# Patient Record
Sex: MALE | Race: WHITE | NOT HISPANIC OR LATINO | Employment: FULL TIME | URBAN - METROPOLITAN AREA
[De-identification: names, ages, dates, MRNs, and addresses within clinical notes are randomized per-mention and may not be internally consistent; named-entity substitution may affect disease eponyms.]

---

## 2017-11-08 ENCOUNTER — OFFICE VISIT (OUTPATIENT)
Dept: FAMILY MEDICINE | Facility: CLINIC | Age: 32
End: 2017-11-08
Payer: COMMERCIAL

## 2017-11-08 VITALS
HEIGHT: 69 IN | DIASTOLIC BLOOD PRESSURE: 80 MMHG | BODY MASS INDEX: 46.65 KG/M2 | HEART RATE: 70 BPM | SYSTOLIC BLOOD PRESSURE: 178 MMHG | WEIGHT: 315 LBS | TEMPERATURE: 97.2 F

## 2017-11-08 DIAGNOSIS — I10 ESSENTIAL HYPERTENSION WITH GOAL BLOOD PRESSURE LESS THAN 140/90: Primary | ICD-10-CM

## 2017-11-08 LAB
ANION GAP SERPL CALCULATED.3IONS-SCNC: 6 MMOL/L (ref 3–14)
BUN SERPL-MCNC: 14 MG/DL (ref 7–30)
CALCIUM SERPL-MCNC: 8.6 MG/DL (ref 8.5–10.1)
CHLORIDE SERPL-SCNC: 107 MMOL/L (ref 94–109)
CO2 SERPL-SCNC: 28 MMOL/L (ref 20–32)
CREAT SERPL-MCNC: 0.78 MG/DL (ref 0.66–1.25)
GFR SERPL CREATININE-BSD FRML MDRD: >90 ML/MIN/1.7M2
GLUCOSE SERPL-MCNC: 101 MG/DL (ref 70–99)
LDLC SERPL DIRECT ASSAY-MCNC: 119 MG/DL
POTASSIUM SERPL-SCNC: 4 MMOL/L (ref 3.4–5.3)
SODIUM SERPL-SCNC: 141 MMOL/L (ref 133–144)

## 2017-11-08 PROCEDURE — 99213 OFFICE O/P EST LOW 20 MIN: CPT | Performed by: NURSE PRACTITIONER

## 2017-11-08 PROCEDURE — 36415 COLL VENOUS BLD VENIPUNCTURE: CPT | Performed by: NURSE PRACTITIONER

## 2017-11-08 PROCEDURE — 83721 ASSAY OF BLOOD LIPOPROTEIN: CPT | Performed by: NURSE PRACTITIONER

## 2017-11-08 PROCEDURE — 80048 BASIC METABOLIC PNL TOTAL CA: CPT | Performed by: NURSE PRACTITIONER

## 2017-11-08 NOTE — NURSING NOTE
"Chief Complaint   Patient presents with     Hypertension     recheck       Initial There were no vitals taken for this visit. Estimated body mass index is 50.8 kg/(m^2) as calculated from the following:    Height as of 2/17/15: 5' 9\" (1.753 m).    Weight as of 12/7/15: 344 lb (156 kg).  Medication Reconciliation: complete  "

## 2017-11-08 NOTE — LETTER
[address] [id number] [phone]       November 10, 2017    José Miguel Dickinson  [address]  [facility] [address]          Dear José Miguel,    Your Fasting blood sugar is borderline at 101, we would like to see this less than 100. LDL cholesterol was good at 119.    Results for orders placed or performed in visit on 11/08/17   Basic metabolic panel   Result Value Ref Range    Sodium 141 133 - 144 mmol/L    Potassium 4.0 3.4 - 5.3 mmol/L    Chloride 107 94 - 109 mmol/L    Carbon Dioxide 28 20 - 32 mmol/L    Anion Gap 6 3 - 14 mmol/L    Glucose 101 (H) 70 - 99 mg/dL    Urea Nitrogen 14 7 - 30 mg/dL    Creatinine 0.78 0.66 - 1.25 mg/dL    GFR Estimate >90 >60 mL/min/1.7m2    GFR Estimate If Black >90 >60 mL/min/1.7m2    Calcium 8.6 8.5 - 10.1 mg/dL   LDL cholesterol direct   Result Value Ref Range    LDL Cholesterol Direct 119 (H) <100 mg/dL       It was a pleasure to see you at your last appointment.    If you have any questions or concerns, please call [phone].    Sincerely,      Debra Schwartz, NIKITA/tf

## 2017-11-08 NOTE — MR AVS SNAPSHOT
"              After Visit Summary   2017    José Miguel Dickinson    MRN: [id number]           Patient Information     Date Of Birth          1985        Visit Information        Provider Department      2017 2:30 PM Debra Schwartz APRN CNP [facility]        Today's Diagnoses     Hypertension goal BP (blood pressure) < 140/90    -  1       Follow-ups after your visit        Who to contact     If you have questions or need follow up information about today's clinic visit or your schedule please contact [facility] directly at [phone].  Normal or non-critical lab and imaging results will be communicated to you by [employer], letter or phone within 4 business days after the clinic has received the results. If you do not hear from us within 7 days, please contact the clinic through [employer] or phone. If you have a critical or abnormal lab result, we will notify you by phone as soon as possible.  Submit refill requests through [employer] or call your pharmacy and they will forward the refill request to us. Please allow 3 business days for your refill to be completed.          Additional Information About Your Visit        MyChart Information     [employer] lets you send messages to your doctor, view your test results, renew your prescriptions, schedule appointments and more. To sign up, go to [email or web address] . Click on \"Log in\" on the left side of the screen, which will take you to the Welcome page. Then click on \"Sign up Now\" on the right side of the page.     You will be asked to enter the access code listed below, as well as some personal information. Please follow the directions to create your username and password.     Your access code is: [email or web address]  Expires: 2018  3:54 PM     Your access code will  in 90 days. If you need help or a new code, please call your [facility] or [phone].        Care EveryWhere ID     This is your Care EveryWhere ID. " "This could be used by other organizations to access your [place] medical records  [id number]        Your Vitals Were     Pulse Temperature Height BMI (Body Mass Index)          70 97.2  F (36.2  C) (Tympanic) 5' 9\" (1.753 m) 50.18 kg/m2         Blood Pressure from Last 3 Encounters:   11/08/17 178/80   12/07/15 124/80   11/28/15 150/89    Weight from Last 3 Encounters:   11/08/17 (!) 339 lb 12.8 oz (154.1 kg)   12/07/15 (!) 344 lb (156 kg)   11/28/15 (!) 345 lb (156.5 kg)              We Performed the Following     Basic metabolic panel     LDL cholesterol direct        Primary Care Provider Office Phone # Fax #    Nghia Barrow [phone]       [facility] [address] [place] [address]        Equal Access to Services     MAYUR ASH : Hadii aad ku hadasho Soomaali, waaxda luqadaha, qaybta kaalmada adeegyada, waxay chonin haydarwinn nissa suero . So [facility] [place] [facility] [phone].    ATENCIÓN: Si habla español, tiene a wyman disposición servicios gratuitos de asistencia lingüística. Llame al [phone].    We comply with applicable federal civil rights laws and [place] laws. We do not discriminate on the basis of race, color, national origin, age, disability, sex, sexual orientation, or gender identity.            Thank you!     Thank you for choosing [facility]  for your care. Our goal is always to provide you with excellent care. Hearing back from our patients is one way we can continue to improve our services. Please take a few minutes to complete the written survey that you may receive in the mail after your visit with us. Thank you!             Your Updated Medication List - Protect others around you: Learn how to safely use, store and throw away your medicines at www.disposemymeds.org.          This list is accurate as of: 11/8/17  3:54 PM.  Always use your most recent med list.                   Brand Name Dispense Instructions for use Diagnosis    " lisinopril 10 MG tablet    PRINIVIL/ZESTRIL    30 tablet    TAKE ONE TABLET BY MOUTH ONCE DAILY    Hypertension goal BP (blood pressure) < 140/90       omeprazole 20 MG CR capsule    priLOSEC    60 capsule    TAKE 1 CAPSULE (20 MG) BY MOUTH 2 TIMES DAILY    Gastroesophageal reflux disease without esophagitis

## 2017-11-08 NOTE — PROGRESS NOTES
SUBJECTIVE:   José Miguel Dickinson is a 31 year old male who presents to clinic today for the following health issues:      Hypertension Follow-up      Outpatient blood pressures are being checked at home.  Results are 125-140/80-90/s.    Low Salt Diet: no added salt        Amount of exercise or physical activity: job related activities    Problems taking medications regularly: Yes,  Has been out of meds for about 2 1/2 weeks, hasnt been around to  at pharmacy. Has not started recent refill yet    Medication side effects: none    Diet: regular (no restrictions)        The patient is seen in clinic today to follow-up hypertension management. He hasn't been into the clinic for couple years and is due for lab work. [place], and is only home for short period of time, he states he barely is able to get things done at home, and does not want to come into the clinic anymore than absolute necessary. He ran out of his blood pressure medication 2 or 3 weeks ago. He denies chest pain or palpitations, denies shortness of breath. He's had no problems swelling of the feet or ankles. He is not watching his diet. He is physically active at his job. He denies adverse side effects to the medication. He checks his blood pressure periodically and states it runs in the 120s to the 140s usually. At times it will go up to the 150s, but then comes back down to normal.    Problem list and histories reviewed & adjusted, as indicated.  Additional history: as documented    BP Readings from Last 3 Encounters:   11/08/17 178/80   12/07/15 124/80   11/28/15 150/89    Wt Readings from Last 3 Encounters:   11/08/17 (!) 339 lb 12.8 oz (154.1 kg)   12/07/15 (!) 344 lb (156 kg)   11/28/15 (!) 345 lb (156.5 kg)                      Reviewed and updated as needed this visit by clinical staffTobacco  Allergies  Meds  Med Hx  Surg Hx  Fam Hx  Soc Hx      Reviewed and updated as needed this visit by Provider      "    ROS:  Constitutional, HEENT, cardiovascular, pulmonary, gi and gu systems are negative, except as otherwise noted.      OBJECTIVE:   /80  Pulse 70  Temp 97.2  F (36.2  C) (Tympanic)  Ht 5' 9\" (1.753 m)  Wt (!) 339 lb 12.8 oz (154.1 kg)  BMI 50.18 kg/m2  Body mass index is 50.18 kg/(m^2).   GENERAL: healthy, alert and no distress. He is overweight  NECK: no adenopathy, no asymmetry, masses, or scars and thyroid normal to palpation  RESP: lungs clear to auscultation - no rales, rhonchi or wheezes  CV: regular rate and rhythm, normal S1 S2, no S3 or S4, no murmur, click or rub, no peripheral edema and peripheral pulses strong  ABDOMEN: soft, nontender, no hepatosplenomegaly, no masses and bowel sounds normal  MS: no gross musculoskeletal defects noted, no edema    Diagnostic Test Results:  Results for orders placed or performed in visit on 11/08/17 (from the past 24 hour(s))   Basic metabolic panel   Result Value Ref Range    Sodium 141 133 - 144 mmol/L    Potassium 4.0 3.4 - 5.3 mmol/L    Chloride 107 94 - 109 mmol/L    Carbon Dioxide 28 20 - 32 mmol/L    Anion Gap 6 3 - 14 mmol/L    Glucose 101 (H) 70 - 99 mg/dL    Urea Nitrogen 14 7 - 30 mg/dL    Creatinine 0.78 0.66 - 1.25 mg/dL    GFR Estimate >90 >60 mL/min/1.7m2    GFR Estimate If Black >90 >60 mL/min/1.7m2    Calcium 8.6 8.5 - 10.1 mg/dL   LDL cholesterol direct   Result Value Ref Range    LDL Cholesterol Direct 119 (H) <100 mg/dL       ASSESSMENT/PLAN:     Problem List Items Addressed This Visit        Medium    Essential hypertension with goal blood pressure less than 140/90 - Primary    Relevant Orders    Basic metabolic panel (Completed)    LDL cholesterol direct (Completed)           Continue lisinopril 10 milligrams daily. Prescription has already been refilled, he has not picked it up yet. He will check his blood pressure at home, will contact clinic with the readings. He is aware if it does not come within normal parameters, we may need " to increase the dose or add another medication    SUDHA Joseph CNP  [facility]

## 2017-12-04 DIAGNOSIS — I10 HYPERTENSION GOAL BP (BLOOD PRESSURE) < 140/90: ICD-10-CM

## 2017-12-04 RX ORDER — LISINOPRIL 10 MG/1
TABLET ORAL
Qty: 30 TABLET | Refills: 0 | Status: SHIPPED | OUTPATIENT
Start: 2017-12-04 | End: 2018-01-05

## 2017-12-04 NOTE — TELEPHONE ENCOUNTER
Reason for Call:  Medication or medication refill:    Do you use a [facility]?  Name of the pharmacy and phone number for the current request:  [facility] - [phone]    Name of the medication requested: lisinopril    Other request: patient is needing this medication refilled and refuses to come in and get his blood pressure checked.  He says that he checks it at home and it has been normal.  It is at 130/85    Can we leave a detailed message on this number? YES    Phone number patient can be reached at: Home number on file [phone] (home)    Best Time: anytime    Call taken on 12/4/2017 at 9:11 AM by Linda Rosenthal

## 2018-01-05 DIAGNOSIS — I10 HYPERTENSION GOAL BP (BLOOD PRESSURE) < 140/90: ICD-10-CM

## 2018-01-05 NOTE — TELEPHONE ENCOUNTER
Requested Prescriptions   Pending Prescriptions Disp Refills     lisinopril (PRINIVIL/ZESTRIL) 10 MG tablet [Pharmacy Med Name: LISINOPRIL 10MG TABS] 30 tablet 0     Sig: TAKE ONE TABLET BY MOUTH ONCE DAILY    ACE Inhibitors (Including Combos) Protocol Failed    1/5/2018  2:48 PM       Failed - Blood pressure under 140/90    BP Readings from Last 3 Encounters:   11/08/17 178/80   12/07/15 124/80   11/28/15 150/89                Passed - Recent or future visit with authorizing provider's specialty    Patient had office visit in the last year or has a visit in the next 30 days with authorizing provider.  See chart review.              Passed - Patient is age 18 or older       Passed - Normal serum creatinine on file in past 12 months    Recent Labs   Lab Test  11/08/17   1428   CR  0.78            Passed - Normal serum potassium on file in past 12 months    Recent Labs   Lab Test  11/08/17   1428   POTASSIUM  4.0             Last Written Prescription Date:  12/4/17  Last Fill Quantity: 30,  # refills: 0   Last Office Visit with [facility], P or [facility] prescribing provider:  11/8/17   Future Office Visit:

## 2018-01-08 RX ORDER — LISINOPRIL 10 MG/1
TABLET ORAL
Qty: 30 TABLET | Refills: 0 | Status: SHIPPED | OUTPATIENT
Start: 2018-01-08 | End: 2018-02-15

## 2018-02-15 DIAGNOSIS — I10 HYPERTENSION GOAL BP (BLOOD PRESSURE) < 140/90: ICD-10-CM

## 2018-02-15 RX ORDER — LISINOPRIL 10 MG/1
10 TABLET ORAL DAILY
Qty: 30 TABLET | Refills: 1 | Status: SHIPPED | OUTPATIENT
Start: 2018-02-15 | End: 2018-04-04

## 2018-02-15 NOTE — TELEPHONE ENCOUNTER
Reason for Call:  Medication or medication refill:    Do you use a [facility]?  Name of the pharmacy and phone number for the current request:  [facility] in [place], [place]    Name of the medication requested: lisinopril     Other request: Dede sandra, states that patient needs a new Rx as he has no re-fills and is transferring to another pharmacy Fax to #[phone]    Can we leave a detailed message on this number? YES    Phone number patient can be reached at: Other phone number:  [phone]    Best Time: any    Call taken on 2/15/2018 at 4:01 PM by Dulce Calloway

## 2018-03-07 DIAGNOSIS — I10 HYPERTENSION GOAL BP (BLOOD PRESSURE) < 140/90: ICD-10-CM

## 2018-03-07 NOTE — TELEPHONE ENCOUNTER
"Requested Prescriptions   Pending Prescriptions Disp Refills     lisinopril (PRINIVIL/ZESTRIL) 10 MG tablet [Pharmacy Med Name: LISINOPRIL 10MG TABS] 30 tablet 0     Sig: TAKE ONE TABLET BY MOUTH ONCE DAILY    ACE Inhibitors (Including Combos) Protocol Failed    3/7/2018  9:35 AM       Failed - Blood pressure under 140/90 in past 12 months    BP Readings from Last 3 Encounters:   11/08/17 178/80   12/07/15 124/80   11/28/15 150/89                Passed - Recent (12 mo) or future (30 days) visit within the authorizing provider's specialty    Patient had office visit in the last year or has a visit in the next 30 days with authorizing provider.  See \"Patient Info\" tab in inbasket, or \"Choose Columns\" in Meds & Orders section of the refill encounter.            Passed - Patient is age 18 or older       Passed - Normal serum creatinine on file in past 12 months    Recent Labs   Lab Test  11/08/17   1428   CR  0.78            Passed - Normal serum potassium on file in past 12 months    Recent Labs   Lab Test  11/08/17   1428   POTASSIUM  4.0               Last Written Prescription Date:  2/15/18  Last Fill Quantity: 30,  # refills: 1   Last Office Visit with G, P or [facility] prescribing provider:  *11/8/17  Future Office Visit:       "

## 2018-03-09 NOTE — TELEPHONE ENCOUNTER
Routing refill request to provider for review/approval because:  Bp 178/80  Elke Barrow RN

## 2018-03-11 RX ORDER — LISINOPRIL 10 MG/1
TABLET ORAL
Qty: 30 TABLET | Refills: 0 | Status: SHIPPED | OUTPATIENT
Start: 2018-03-11 | End: 2018-04-04

## 2018-04-04 ENCOUNTER — OFFICE VISIT (OUTPATIENT)
Dept: FAMILY MEDICINE | Facility: CLINIC | Age: 33
End: 2018-04-04
Payer: COMMERCIAL

## 2018-04-04 VITALS
TEMPERATURE: 97.8 F | HEART RATE: 95 BPM | HEIGHT: 69 IN | DIASTOLIC BLOOD PRESSURE: 84 MMHG | SYSTOLIC BLOOD PRESSURE: 156 MMHG | BODY MASS INDEX: 46.65 KG/M2 | WEIGHT: 315 LBS | OXYGEN SATURATION: 97 %

## 2018-04-04 DIAGNOSIS — I10 HYPERTENSION GOAL BP (BLOOD PRESSURE) < 140/90: Primary | ICD-10-CM

## 2018-04-04 DIAGNOSIS — K21.9 GASTROESOPHAGEAL REFLUX DISEASE WITHOUT ESOPHAGITIS: ICD-10-CM

## 2018-04-04 PROCEDURE — 99214 OFFICE O/P EST MOD 30 MIN: CPT | Performed by: FAMILY MEDICINE

## 2018-04-04 RX ORDER — LISINOPRIL 10 MG/1
10 TABLET ORAL DAILY
Qty: 90 TABLET | Refills: 3 | Status: SHIPPED | OUTPATIENT
Start: 2018-04-04 | End: 2019-06-22

## 2018-04-04 NOTE — MR AVS SNAPSHOT
"              After Visit Summary   2018    José Miguel Dickinson    MRN: [id number]           Patient Information     Date Of Birth          1985        Visit Information        Provider Department      2018 11:40 AM Nghia Barrow MD [facility]        Today's Diagnoses     Hypertension goal BP (blood pressure) < 140/90    -  1    Gastroesophageal reflux disease without esophagitis           Follow-ups after your visit        Who to contact     If you have questions or need follow up information about today's clinic visit or your schedule please contact [facility] directly at [phone].  Normal or non-critical lab and imaging results will be communicated to you by [employer], letter or phone within 4 business days after the clinic has received the results. If you do not hear from us within 7 days, please contact the clinic through [employer] or phone. If you have a critical or abnormal lab result, we will notify you by phone as soon as possible.  Submit refill requests through [employer] or call your pharmacy and they will forward the refill request to us. Please allow 3 business days for your refill to be completed.          Additional Information About Your Visit        MyChart Information     [employer] lets you send messages to your doctor, view your test results, renew your prescriptions, schedule appointments and more. To sign up, go to [email or web address] . Click on \"Log in\" on the left side of the screen, which will take you to the Welcome page. Then click on \"Sign up Now\" on the right side of the page.     You will be asked to enter the access code listed below, as well as some personal information. Please follow the directions to create your username and password.     Your access code is: [email or web address]  Expires: 7/3/2018 12:46 PM     Your access code will  in 90 days. If you need help or a new code, please call your [facility] or [phone].        Care " "EveryWhere ID     This is your Care EveryWhere ID. This could be used by other organizations to access your [place] medical records  [id number]        Your Vitals Were     Pulse Temperature Height Pulse Oximetry BMI (Body Mass Index)       95 97.8  F (36.6  C) (Temporal) 5' 9\" (1.753 m) 97% 51.27 kg/m2        Blood Pressure from Last 3 Encounters:   04/04/18 156/84   11/08/17 178/80   12/07/15 124/80    Weight from Last 3 Encounters:   04/04/18 (!) 347 lb 3.2 oz (157.5 kg)   11/08/17 (!) 339 lb 12.8 oz (154.1 kg)   12/07/15 (!) 344 lb (156 kg)              Today, you had the following     No orders found for display         Today's Medication Changes          These changes are accurate as of 4/4/18 12:46 PM.  If you have any questions, ask your nurse or doctor.               These medicines have changed or have updated prescriptions.        Dose/Directions    omeprazole 20 MG CR capsule   Commonly known as:  priLOSEC   This may have changed:  See the new instructions.   Used for:  Gastroesophageal reflux disease without esophagitis   Changed by:  Nghia Barrow MD        TAKE 1 CAPSULE (20 MG) BY MOUTH 2 TIMES DAILY   Quantity:  60 capsule   Refills:  3            Where to get your medicines      These medications were sent to [address]  [address], [facility] [address]     Phone:  [phone]     lisinopril 10 MG tablet    omeprazole 20 MG CR capsule                Primary Care Provider Office Phone # Fax #    Nghia Barrow [phone]       [address] [facility] [address]        Equal Access to Services     PATRICIA ASH AH: Hadii aicha heard hadana Sojune, waaxda luqadaha, qaybta kaalmada adeegyada, hosea angelo. So [facility] [phone].    ATENCIÓN: Si habla español, tiene a wyman disposición servicios gratuitos de asistencia lingüística. Llame al [phone].    We comply with applicable federal civil rights laws " and [place] laws. We do not discriminate on the basis of race, color, national origin, age, disability, sex, sexual orientation, or gender identity.            Thank you!     Thank you for choosing [facility]  for your care. Our goal is always to provide you with excellent care. Hearing back from our patients is one way we can continue to improve our services. Please take a few minutes to complete the written survey that you may receive in the mail after your visit with us. Thank you!             Your Updated Medication List - Protect others around you: Learn how to safely use, store and throw away your medicines at www.disposemymeds.org.          This list is accurate as of 4/4/18 12:46 PM.  Always use your most recent med list.                   Brand Name Dispense Instructions for use Diagnosis    lisinopril 10 MG tablet    PRINIVIL/ZESTRIL    90 tablet    Take 1 tablet (10 mg) by mouth daily    Hypertension goal BP (blood pressure) < 140/90       omeprazole 20 MG CR capsule    priLOSEC    60 capsule    TAKE 1 CAPSULE (20 MG) BY MOUTH 2 TIMES DAILY    Gastroesophageal reflux disease without esophagitis

## 2018-04-04 NOTE — NURSING NOTE
"Chief Complaint   Patient presents with     Hypertension     recheck        Initial /84 (BP Location: Right arm, Patient Position: Chair, Cuff Size: Adult Large)  Pulse 95  Temp 97.8  F (36.6  C) (Temporal)  Ht 5' 9\" (1.753 m)  Wt (!) 347 lb 3.2 oz (157.5 kg)  SpO2 97%  BMI 51.27 kg/m2 Estimated body mass index is 51.27 kg/(m^2) as calculated from the following:    Height as of this encounter: 5' 9\" (1.753 m).    Weight as of this encounter: 347 lb 3.2 oz (157.5 kg).  Medication Reconciliation: complete    "

## 2018-04-04 NOTE — PROGRESS NOTES
SUBJECTIVE:   José Miguel Dickinson is a 32 year old male who presents to clinic today for the following health issues:      Hypertension Follow-up      Outpatient blood pressures are being checked at home.  Results are 140's.    Low Salt Diet: no added salt      Amount of exercise or physical activity: works too much to do it.     Problems taking medications regularly: No    Medication side effects: none    Diet: low salt            Problem list and histories reviewed & adjusted, as indicated.  Additional history: as documented        Reviewed and updated as needed this visit by clinical staff       Reviewed and updated as needed this visit by Provider        SUBJECTIVE:  José Miguel  is a 32 year old male who presents for: Follow-up of his hypertension and GERD.  He lives in [facility] and is been running out of medications because of inconsistencies in refills.  Typically he states his systolics are in the upper 130s and his diastolics are in the 80s.  He does not want to go up on his dose as he did once a little dizzy.  He currently has been out of his medication.  Also needs refill on his Prilosec for GERD doing fairly well with this.    No past medical history on file.  Past Surgical History:   Procedure Laterality Date     HEAD & NECK SURGERY       Social History   Substance Use Topics     Smoking status: Former Smoker     Smokeless tobacco: Never Used     Alcohol use Yes      Comment: limited     Current Outpatient Prescriptions   Medication Sig Dispense Refill     lisinopril (PRINIVIL/ZESTRIL) 10 MG tablet Take 1 tablet (10 mg) by mouth daily 90 tablet 3     omeprazole (PRILOSEC) 20 MG CR capsule TAKE 1 CAPSULE (20 MG) BY MOUTH 2 TIMES DAILY 60 capsule 3     lisinopril (PRINIVIL/ZESTRIL) 10 MG tablet TAKE ONE TABLET BY MOUTH ONCE DAILY (Patient not taking: Reported on 4/4/2018) 30 tablet 0     [DISCONTINUED] lisinopril (PRINIVIL/ZESTRIL) 10 MG tablet Take 1 tablet (10 mg) by mouth daily 30 tablet 1  "      REVIEW OF SYSTEMS:   5 point ROS negative except as noted above in HPI, including Gen., Resp, CV, GI &  system review.     OBJECTIVE:  Vitals: /84 (BP Location: Right arm, Patient Position: Chair, Cuff Size: Adult Large)  Pulse 95  Temp 97.8  F (36.6  C) (Temporal)  Ht 5' 9\" (1.753 m)  Wt (!) 347 lb 3.2 oz (157.5 kg)  SpO2 97%  BMI 51.27 kg/m2  BMI= Body mass index is 51.27 kg/(m^2).  Is alert oriented.  Appears quite comfortable.  Lungs are clear.  Heart regular rhythm no murmur.  Abdomen obese bowel sounds present no tenderness.  Skin clear.  Extremities normal.    ASSESSMENT:  #1 hypertension #2 GERD    PLAN:  Renew his lisinopril 10 mg a day.  Had some blood work done last November with a chemistry panel which was reviewed and okay.  Renew his Prilosec.  We will try to keep him with medications.  He comes back once in a while has had trouble getting an appointment I told him he needs to call our appointment line and not the central scheduling.        Nghia Barrow MD  [facility]              "

## 2019-06-22 DIAGNOSIS — I10 HYPERTENSION GOAL BP (BLOOD PRESSURE) < 140/90: ICD-10-CM

## 2019-06-25 RX ORDER — LISINOPRIL 10 MG/1
TABLET ORAL
Qty: 30 TABLET | Refills: 0 | Status: SHIPPED | OUTPATIENT
Start: 2019-06-25 | End: 2019-06-27 | Stop reason: DRUGHIGH

## 2019-06-25 NOTE — TELEPHONE ENCOUNTER
"June 25, 2019    Last Written Prescription Date:  4/4/18  Last Fill Quantity: 90,  # refills: 3   Last office visit: 4/4/2018 with prescribing provider:  Dr. Barrow   Future Office Visit:  NONE    Requested Prescriptions   Pending Prescriptions Disp Refills     lisinopril (PRINIVIL/ZESTRIL) 10 MG tablet [Pharmacy Med Name: LISINOPRIL 10MG TABS] 90 tablet 3     Sig: TAKE ONE TABLET BY MOUTH ONCE DAILY       ACE Inhibitors (Including Combos) Protocol Failed - 6/22/2019  5:49 AM        Failed - Blood pressure under 140/90 in past 12 months     BP Readings from Last 3 Encounters:   04/04/18 156/84   11/08/17 178/80   12/07/15 124/80                 Failed - Recent (12 mo) or future (30 days) visit within the authorizing provider's specialty     Patient had office visit in the last 12 months or has a visit in the next 30 days with authorizing provider or within the authorizing provider's specialty.  See \"Patient Info\" tab in inbasket, or \"Choose Columns\" in Meds & Orders section of the refill encounter.              Failed - Normal serum creatinine on file in past 12 months     Recent Labs   Lab Test 11/08/17  1428   CR 0.78             Failed - Normal serum potassium on file in past 12 months     Recent Labs   Lab Test 11/08/17  1428   POTASSIUM 4.0             Passed - Medication is active on med list        Passed - Patient is age 18 or older          Medication is being filled for 1 time refill only due to:  Patient needs labs K+ and Creat. Patient needs to be seen because it has been more than one year since last visit.- Routed to scheduling to call patient and schedule appt.       Fabiola Salcedo on 6/25/2019 at 7:33 AM    "

## 2019-06-27 ENCOUNTER — OFFICE VISIT (OUTPATIENT)
Dept: FAMILY MEDICINE | Facility: CLINIC | Age: 34
End: 2019-06-27
Payer: COMMERCIAL

## 2019-06-27 VITALS
SYSTOLIC BLOOD PRESSURE: 138 MMHG | HEIGHT: 70 IN | RESPIRATION RATE: 16 BRPM | TEMPERATURE: 97.8 F | BODY MASS INDEX: 45.1 KG/M2 | OXYGEN SATURATION: 97 % | DIASTOLIC BLOOD PRESSURE: 76 MMHG | HEART RATE: 81 BPM | WEIGHT: 315 LBS

## 2019-06-27 DIAGNOSIS — N46.9 INFERTILITY MALE: ICD-10-CM

## 2019-06-27 DIAGNOSIS — I10 ESSENTIAL HYPERTENSION WITH GOAL BLOOD PRESSURE LESS THAN 140/90: Primary | ICD-10-CM

## 2019-06-27 DIAGNOSIS — Z13.6 CARDIOVASCULAR SCREENING; LDL GOAL LESS THAN 160: ICD-10-CM

## 2019-06-27 DIAGNOSIS — I10 HYPERTENSION GOAL BP (BLOOD PRESSURE) < 140/90: ICD-10-CM

## 2019-06-27 LAB
ALBUMIN SERPL-MCNC: 3.7 G/DL (ref 3.4–5)
ALP SERPL-CCNC: 75 U/L (ref 40–150)
ALT SERPL W P-5'-P-CCNC: 47 U/L (ref 0–70)
ANION GAP SERPL CALCULATED.3IONS-SCNC: 7 MMOL/L (ref 3–14)
AST SERPL W P-5'-P-CCNC: 25 U/L (ref 0–45)
BILIRUB SERPL-MCNC: 0.5 MG/DL (ref 0.2–1.3)
BUN SERPL-MCNC: 16 MG/DL (ref 7–30)
CALCIUM SERPL-MCNC: 8.9 MG/DL (ref 8.5–10.1)
CHLORIDE SERPL-SCNC: 106 MMOL/L (ref 94–109)
CHOLEST SERPL-MCNC: 168 MG/DL
CO2 SERPL-SCNC: 28 MMOL/L (ref 20–32)
CREAT SERPL-MCNC: 0.85 MG/DL (ref 0.66–1.25)
GFR SERPL CREATININE-BSD FRML MDRD: >90 ML/MIN/{1.73_M2}
GLUCOSE SERPL-MCNC: 104 MG/DL (ref 70–99)
HDLC SERPL-MCNC: 28 MG/DL
LDLC SERPL CALC-MCNC: 97 MG/DL
NONHDLC SERPL-MCNC: 140 MG/DL
POTASSIUM SERPL-SCNC: 4.5 MMOL/L (ref 3.4–5.3)
PROT SERPL-MCNC: 7.3 G/DL (ref 6.8–8.8)
SODIUM SERPL-SCNC: 141 MMOL/L (ref 133–144)
TRIGL SERPL-MCNC: 216 MG/DL

## 2019-06-27 PROCEDURE — 36415 COLL VENOUS BLD VENIPUNCTURE: CPT | Performed by: FAMILY MEDICINE

## 2019-06-27 PROCEDURE — 80061 LIPID PANEL: CPT | Performed by: FAMILY MEDICINE

## 2019-06-27 PROCEDURE — 99214 OFFICE O/P EST MOD 30 MIN: CPT | Performed by: FAMILY MEDICINE

## 2019-06-27 PROCEDURE — 80053 COMPREHEN METABOLIC PANEL: CPT | Performed by: FAMILY MEDICINE

## 2019-06-27 RX ORDER — LISINOPRIL 20 MG/1
20 TABLET ORAL DAILY
Qty: 90 TABLET | Refills: 3 | Status: SHIPPED | OUTPATIENT
Start: 2019-06-27 | End: 2020-08-06

## 2019-06-27 RX ORDER — LISINOPRIL 10 MG/1
10 TABLET ORAL DAILY
Qty: 90 TABLET | Refills: 3 | Status: CANCELLED | OUTPATIENT
Start: 2019-06-27

## 2019-06-27 ASSESSMENT — MIFFLIN-ST. JEOR: SCORE: 2622.6

## 2019-06-27 NOTE — PROGRESS NOTES
Subjective     José Miguel Dickinson is a 33 year old male who presents to clinic today for the following health issues:    HPI   Hypertension Follow-up      Do you check your blood pressure regularly outside of the clinic? No     Are you following a low salt diet? No    Are your blood pressures ever more than 140 on the top number (systolic) OR more   than 90 on the bottom number (diastolic), for example 140/90? Unsure not checking    Amount of exercise or physical activity: 2-3 days/week for an average of 30-45 minutes    Problems taking medications regularly: No    Medication side effects: none    Diet: regular (no restrictions)    SUBJECTIVE:  José Miguel  is a 33 year old male who presents for: All up of his high blood pressure.  He has been under a lot of stress lately with changes at work and some situations around his home regarding land borders.  He feels his blood pressure has been up.  He is gained about 20 pounds since last year.  He is not watching his diet.  Also has another concern about infertility.  He states they have been trying for over a year and have not had a pregnancy.  Apparently his wife has had some work-up.  He was told he needed to be worked up.    History reviewed. No pertinent past medical history.  Past Surgical History:   Procedure Laterality Date     HEAD & NECK SURGERY       Social History     Tobacco Use     Smoking status: Former Smoker     Smokeless tobacco: Never Used   Substance Use Topics     Alcohol use: Yes     Comment: limited     Current Outpatient Medications   Medication Sig Dispense Refill     lisinopril (PRINIVIL/ZESTRIL) 20 MG tablet Take 1 tablet (20 mg) by mouth daily 90 tablet 3     omeprazole (PRILOSEC) 20 MG CR capsule TAKE 1 CAPSULE (20 MG) BY MOUTH 2 TIMES DAILY 60 capsule 3       REVIEW OF SYSTEMS:   5 point ROS negative except as noted above in HPI, including Gen., Resp, CV, GI &  system review.     OBJECTIVE:  Vitals: /76   Pulse 81   Temp 97.8  F (36.6  " C) (Temporal)   Resp 16   Ht 1.785 m (5' 10.28\")   Wt (!) 166.7 kg (367 lb 8 oz)   SpO2 97%   BMI 52.32 kg/m    BMI= Body mass index is 52.32 kg/m .  He is alert appears in no distress.  Eyes PERRLA.  Neck supple.  Lungs clear.  Heart regular rhythm rate in the 80s.  Extremities normal.    ASSESSMENT:  #1 hypertension #2 infertility    PLAN:  Will going to increase his lisinopril to 20 mg a day.  He remembers when he first started he was sometimes a little bit dizzy.  But his blood pressures have been higher lately and his weights up so I think this will be safe.  He will check some blood pressures.  Contact us if it is too low.  We are putting in a consult for infertility testing for him.    Weight management plan: Discussed healthy diet and exercise guidelines    Nghia Barrow MD  [facility]            " Home

## 2020-02-14 ENCOUNTER — HOSPITAL ENCOUNTER (EMERGENCY)
Facility: CLINIC | Age: 35
Discharge: HOME | End: 2020-02-14
Attending: EMERGENCY MEDICINE | Admitting: EMERGENCY MEDICINE
Payer: COMMERCIAL

## 2020-02-14 ENCOUNTER — TELEPHONE (OUTPATIENT)
Dept: FAMILY MEDICINE | Facility: CLINIC | Age: 35
End: 2020-02-14

## 2020-02-14 VITALS
TEMPERATURE: 98.5 F | SYSTOLIC BLOOD PRESSURE: 169 MMHG | WEIGHT: 315 LBS | RESPIRATION RATE: 7 BRPM | OXYGEN SATURATION: 98 % | DIASTOLIC BLOOD PRESSURE: 97 MMHG | BODY MASS INDEX: 50.54 KG/M2 | HEART RATE: 80 BPM

## 2020-02-14 DIAGNOSIS — T50.901A ACCIDENTAL OVERDOSE, INITIAL ENCOUNTER: ICD-10-CM

## 2020-02-14 LAB
ANION GAP SERPL CALCULATED.3IONS-SCNC: 6 MMOL/L (ref 3–14)
BUN SERPL-MCNC: 18 MG/DL (ref 7–30)
CALCIUM SERPL-MCNC: 8.9 MG/DL (ref 8.5–10.1)
CHLORIDE SERPL-SCNC: 110 MMOL/L (ref 94–109)
CO2 SERPL-SCNC: 25 MMOL/L (ref 20–32)
CREAT SERPL-MCNC: 0.75 MG/DL (ref 0.66–1.25)
GFR SERPL CREATININE-BSD FRML MDRD: >90 ML/MIN/{1.73_M2}
GLUCOSE SERPL-MCNC: 122 MG/DL (ref 70–99)
POTASSIUM SERPL-SCNC: 4.3 MMOL/L (ref 3.4–5.3)
SODIUM SERPL-SCNC: 141 MMOL/L (ref 133–144)

## 2020-02-14 PROCEDURE — 99283 EMERGENCY DEPT VISIT LOW MDM: CPT | Performed by: EMERGENCY MEDICINE

## 2020-02-14 PROCEDURE — 99283 EMERGENCY DEPT VISIT LOW MDM: CPT | Mod: Z6 | Performed by: EMERGENCY MEDICINE

## 2020-02-14 PROCEDURE — 80048 BASIC METABOLIC PNL TOTAL CA: CPT | Performed by: EMERGENCY MEDICINE

## 2020-02-14 ASSESSMENT — ENCOUNTER SYMPTOMS
COLOR CHANGE: 0
CONFUSION: 0
ARTHRALGIAS: 0
HEADACHES: 0
NECK STIFFNESS: 0
ABDOMINAL PAIN: 0
FEVER: 0
EYE REDNESS: 0
SHORTNESS OF BREATH: 0
DIFFICULTY URINATING: 0

## 2020-02-14 NOTE — ED PROVIDER NOTES
History     Chief Complaint   Patient presents with     Drug Overdose     The history is provided by the patient and a significant other.     José Miguel Dickinson is a 34 year old male who presents to the ED complaining of over-ingestion of his blood pressure medications. Patient stated he was getting up to go to work at 0330 and took his pills as normally but had put them all together in the same bottle because he had been organizing for a snowmobile trip. He realized that he had taken 15 of his daily 20mg Lisinopril tablets at the same time and he called poison control and was advised to come in.  He has not had any dizziness and no chest pain due to taking more than the prescribed dose.     Allergies:  Allergies   Allergen Reactions     Adhesive Tape      silk tape     Augmentin Rash       Problem List:    Patient Active Problem List    Diagnosis Date Noted     Essential hypertension with goal blood pressure less than 140/90 11/08/2017     Priority: Medium     Esophageal reflux 03/31/2016     Priority: Medium     CARDIOVASCULAR SCREENING; LDL GOAL LESS THAN 160 10/31/2010     Priority: Medium     Morbid obesity (H) 08/20/2005     Priority: Medium     Ingrowing nail 08/20/2005     Priority: Medium        Past Medical History:    History reviewed. No pertinent past medical history.    Past Surgical History:    Past Surgical History:   Procedure Laterality Date     HEAD & NECK SURGERY         Family History:    Family History   Problem Relation Age of Onset     Hypertension Father      Diabetes Paternal Grandmother      Hypertension Paternal Grandmother      Hypertension Paternal Grandfather      Diabetes Brother        Social History:  Marital Status:  Single [1]  Social History     Tobacco Use     Smoking status: Former Smoker     Smokeless tobacco: Never Used   Substance Use Topics     Alcohol use: Yes     Comment: limited     Drug use: No        Medications:    lisinopril (PRINIVIL/ZESTRIL) 20 MG tablet  omeprazole  (PRILOSEC) 20 MG CR capsule          Review of Systems   Constitutional: Negative for fever.   HENT: Negative for congestion.    Eyes: Negative for redness.   Respiratory: Negative for shortness of breath.    Cardiovascular: Negative for chest pain.   Gastrointestinal: Negative for abdominal pain.   Genitourinary: Negative for difficulty urinating.   Musculoskeletal: Negative for arthralgias and neck stiffness.   Skin: Negative for color change.   Neurological: Negative for headaches.   Psychiatric/Behavioral: Negative for confusion.   All other systems reviewed and are negative.      Physical Exam   BP: (!) 178/86  Pulse: 86  Heart Rate: 81  Temp: 98.5  F (36.9  C)  Resp: 18  Weight: (!) 161 kg (355 lb)  SpO2: 98 %      Physical Exam  Vitals signs and nursing note reviewed.   Constitutional:       General: He is not in acute distress.     Appearance: Normal appearance. He is not toxic-appearing.   HENT:      Head: Normocephalic and atraumatic.      Right Ear: External ear normal.      Left Ear: External ear normal.      Nose: Nose normal.      Mouth/Throat:      Mouth: Mucous membranes are moist.      Pharynx: No oropharyngeal exudate or posterior oropharyngeal erythema.   Eyes:      General: No scleral icterus.     Extraocular Movements: Extraocular movements intact.   Neck:      Musculoskeletal: Normal range of motion.   Cardiovascular:      Rate and Rhythm: Normal rate and regular rhythm.      Heart sounds: Normal heart sounds.   Pulmonary:      Effort: Pulmonary effort is normal. No respiratory distress.      Breath sounds: Normal breath sounds. No stridor. No wheezing, rhonchi or rales.   Abdominal:      General: There is no distension.      Tenderness: There is no abdominal tenderness.   Musculoskeletal: Normal range of motion.   Skin:     General: Skin is warm and dry.      Coloration: Skin is not jaundiced.      Findings: No erythema.   Neurological:      General: No focal deficit present.      Mental  Status: He is alert and oriented to person, place, and time. Mental status is at baseline.      Cranial Nerves: No cranial nerve deficit.      Motor: No weakness.   Psychiatric:         Mood and Affect: Mood normal.         Behavior: Behavior normal.         Thought Content: Thought content normal.         ED Course        Procedures               Critical Care time:  none               Results for orders placed or performed during the hospital encounter of 02/14/20 (from the past 24 hour(s))   Basic metabolic panel   Result Value Ref Range    Sodium 141 133 - 144 mmol/L    Potassium 4.3 3.4 - 5.3 mmol/L    Chloride 110 (H) 94 - 109 mmol/L    Carbon Dioxide 25 20 - 32 mmol/L    Anion Gap 6 3 - 14 mmol/L    Glucose 122 (H) 70 - 99 mg/dL    Urea Nitrogen 18 7 - 30 mg/dL    Creatinine 0.75 0.66 - 1.25 mg/dL    GFR Estimate >90 >60 mL/min/[1.73_m2]    GFR Estimate If Black >90 >60 mL/min/[1.73_m2]    Calcium 8.9 8.5 - 10.1 mg/dL       Medications - No data to display    Assessments & Plan (with Medical Decision Making)  34-year-old male who accidentally took a reported 15 tablets of his lisinopril 14 hours ago.  There does not appear to be any  significant side effects from this as his renal function is still normal and he is not hypotensive.  Case was reviewed with poison center and they felt he was appropriate for discharge to home at this point.  He will continue to push fluids.  Follow-up with primary care.   return anytime to the emergency department if further concern.     I have reviewed the nursing notes.    I have reviewed the findings, diagnosis, plan and need for follow up with the patient.       Discharge Medication List as of 2/14/2020  5:16 PM            Final diagnoses:   Accidental overdose, initial encounter     This document serves as a record of services personally performed by Baljinder Fu MD.  It was created on their behalf by Netta Lou, a trained medical scribe. The creation of this record  is based on the provider's personal observations and the statements of the patient. This document has been checked and approved by the attending provider.    Disclaimer : This note consists of symbols derived from keyboarding, dictation and/or voice recognition software. As a result, there may be errors in the script that have gone undetected. Please consider this when interpreting information found in this chart.    2/14/2020   [facility] EMERGENCY DEPARTMENT     Baljinder Fu MD  02/14/20 1943

## 2020-02-14 NOTE — ED TRIAGE NOTES
Presents to ED with concerns of drug overdose. Patient puts his pills together at night in one bottle for then next day. Got up took his pills and went to work and after work was going to get his medications together for the weekend and noticed he had accidentally taken approx. fifteen 20mg Lisinopril pills. Called poison control and was advised to come in.

## 2020-02-14 NOTE — ED AVS SNAPSHOT
[facility] Emergency Department  [address] [place] [address]  Phone:  [phone]  Fax:  [phone]                                    José Miguel Dickinson   MRN: [id number]    Department:  [facility] Emergency Department   Date of Visit:  2/14/2020           After Visit Summary Signature Page    I have received my discharge instructions, and my questions have been answered. I have discussed any challenges I see with this plan with the nurse or doctor.    ..........................................................................................................................................  Patient/Patient Representative Signature      ..........................................................................................................................................  Patient Representative Print Name and Relationship to Patient    ..................................................               ................................................  Date                                   Time    ..........................................................................................................................................  Reviewed by Signature/Title    ...................................................              ..............................................  Date                                               Time          22EPIC Rev 08/18

## 2020-02-14 NOTE — TELEPHONE ENCOUNTER
"S-(situation): José Miguel calls today just realizing that he took 10-20 pills of his lisinopril 20mg tabs.     B-(background): José Miguel usually keeps all his meds in one bottle and takes them all at one time. He took the wrong bottle this am when he was half asleep.     A-(assessment): states he feels fine, \"been working all day.\"    R-(recommendations): warm transfer to poison control.    Elke Barrow RN        "

## 2020-08-05 DIAGNOSIS — I10 ESSENTIAL HYPERTENSION WITH GOAL BLOOD PRESSURE LESS THAN 140/90: ICD-10-CM

## 2020-08-06 RX ORDER — LISINOPRIL 20 MG/1
TABLET ORAL
Qty: 90 TABLET | Refills: 0 | Status: SHIPPED | OUTPATIENT
Start: 2020-08-06 | End: 2020-08-19 | Stop reason: ALTCHOICE

## 2020-08-06 NOTE — TELEPHONE ENCOUNTER
Medication is being filled for 1 time refill only due to:  Patient needs to be seen because it has been more than one year since last visit.     Routing to schedulers to set up face to face appointment for patient for physical.  Patient has been given #90 to get to appointment per triage protocol.    JORDEN RoseN, RN  [facility]

## 2020-08-19 ENCOUNTER — OFFICE VISIT (OUTPATIENT)
Dept: FAMILY MEDICINE | Facility: CLINIC | Age: 35
End: 2020-08-19
Payer: COMMERCIAL

## 2020-08-19 VITALS
DIASTOLIC BLOOD PRESSURE: 87 MMHG | TEMPERATURE: 98.4 F | HEART RATE: 77 BPM | RESPIRATION RATE: 24 BRPM | OXYGEN SATURATION: 95 % | SYSTOLIC BLOOD PRESSURE: 143 MMHG | HEIGHT: 70 IN | BODY MASS INDEX: 45.1 KG/M2 | WEIGHT: 315 LBS

## 2020-08-19 DIAGNOSIS — E66.01 MORBID OBESITY (H): ICD-10-CM

## 2020-08-19 DIAGNOSIS — Z00.00 ROUTINE GENERAL MEDICAL EXAMINATION AT A HEALTH CARE FACILITY: Primary | ICD-10-CM

## 2020-08-19 DIAGNOSIS — I10 ESSENTIAL HYPERTENSION WITH GOAL BLOOD PRESSURE LESS THAN 140/90: ICD-10-CM

## 2020-08-19 DIAGNOSIS — N46.9 MALE INFERTILITY: ICD-10-CM

## 2020-08-19 LAB — TSH SERPL DL<=0.005 MIU/L-ACNC: 1.66 MU/L (ref 0.4–4)

## 2020-08-19 PROCEDURE — 99213 OFFICE O/P EST LOW 20 MIN: CPT | Mod: 25 | Performed by: FAMILY MEDICINE

## 2020-08-19 PROCEDURE — 36415 COLL VENOUS BLD VENIPUNCTURE: CPT | Performed by: FAMILY MEDICINE

## 2020-08-19 PROCEDURE — 99395 PREV VISIT EST AGE 18-39: CPT | Performed by: FAMILY MEDICINE

## 2020-08-19 PROCEDURE — 84443 ASSAY THYROID STIM HORMONE: CPT | Performed by: FAMILY MEDICINE

## 2020-08-19 RX ORDER — LOSARTAN POTASSIUM 50 MG/1
50 TABLET ORAL DAILY
Qty: 90 TABLET | Refills: 1 | Status: SHIPPED | OUTPATIENT
Start: 2020-08-19 | End: 2021-04-05

## 2020-08-19 ASSESSMENT — ENCOUNTER SYMPTOMS
HEARTBURN: 0
SORE THROAT: 0
JOINT SWELLING: 0
HEMATOCHEZIA: 0
EYE PAIN: 0
ABDOMINAL PAIN: 0
DIARRHEA: 0
HEADACHES: 0
DYSURIA: 0
CONSTIPATION: 0
HEMATURIA: 0
PALPITATIONS: 0
ARTHRALGIAS: 0
FREQUENCY: 0
COUGH: 0
NERVOUS/ANXIOUS: 0
MYALGIAS: 0
WEAKNESS: 0
FEVER: 0
CHILLS: 0
SHORTNESS OF BREATH: 0
NAUSEA: 0
DIZZINESS: 0
PARESTHESIAS: 0

## 2020-08-19 ASSESSMENT — MIFFLIN-ST. JEOR: SCORE: 2651.55

## 2020-08-19 NOTE — PROGRESS NOTES
Answers for HPI/ROS submitted by the patient on 8/19/2020   Annual Exam:  Frequency of exercise:: 1 day/week  Getting at least 3 servings of Calcium per day:: Yes  Diet:: Regular (no restrictions), Low salt  Taking medications regularly:: Yes  Medication side effects:: None  Bi-annual eye exam:: NO  Dental care twice a year:: NO  Sleep apnea or symptoms of sleep apnea:: Daytime drowsiness  abdominal pain: No  Blood in stool: No  Blood in urine: No  chest pain: No  chills: No  congestion: No  constipation: No  cough: No  diarrhea: No  dizziness: No  ear pain: No  eye pain: No  nervous/anxious: No  fever: No  frequency: No  genital sores: No  headaches: No  hearing loss: No  heartburn: No  arthralgias: No  joint swelling: No  peripheral edema: No  mood changes: No  myalgias: No  nausea: No  dysuria: No  palpitations: No  Skin sensation changes: No  sore throat: No  urgency: No  rash: No  shortness of breath: No  visual disturbance: No  weakness: No  impotence: No  penile discharge: No  Additional concerns today:: Yes  Duration of exercise:: 15-30 minutes    3  SUBJECTIVE:   CC: José Miguel Dickinson is an 34 year old male who presents for preventive health visit.     Hypertension Follow-up      Do you check your blood pressure regularly outside of the clinic? Yes     Are you following a low salt diet? Yes    Are your blood pressures ever more than 140 on the top number (systolic) OR more   than 90 on the bottom number (diastolic), for example 140/90? Yes      Today's PHQ-2 Score:   PHQ-2 ( 1999 [employer]) 8/19/2020 6/27/2019   Q1: Little interest or pleasure in doing things 0 0   Q2: Feeling down, depressed or hopeless 0 0   PHQ-2 Score 0 0   Q1: Little interest or pleasure in doing things Not at all -   Q2: Feeling down, depressed or hopeless Not at all -   PHQ-2 Score 0 -       Abuse: Current or Past(Physical, Sexual or Emotional)- No  Do you feel safe in your environment? Yes        Social History     Tobacco Use      "Smoking status: Former Smoker     Smokeless tobacco: Never Used   Substance Use Topics     Alcohol use: Yes     Comment: limited     If you drink alcohol do you typically have >3 drinks per day or >7 drinks per week?                       Last PSA: No results found for: PSA    Reviewed orders with patient. Reviewed health maintenance and updated orders accordingly - Yes      Reviewed and updated as needed this visit by clinical staff  Allergies  Meds         Reviewed and updated as needed this visit by Provider        No past medical history on file.   Past Surgical History:   Procedure Laterality Date     HEAD & NECK SURGERY         ROS:  CONSTITUTIONAL: NEGATIVE for fever, chills, change in weight  INTEGUMENTARY/SKIN: NEGATIVE for worrisome rashes, moles or lesions  EYES: NEGATIVE for vision changes or irritation  ENT: NEGATIVE for ear, mouth and throat problems  RESP: NEGATIVE for significant cough or SOB  CV: NEGATIVE for chest pain, palpitations or peripheral edema  GI: NEGATIVE for nausea, abdominal pain, heartburn, or change in bowel habits   male: Concerned about infertility  MUSCULOSKELETAL: NEGATIVE for significant arthralgias or myalgia  NEURO: NEGATIVE for weakness, dizziness or paresthesias  ENDOCRINE: Concerned about inability to lose weight.  PSYCHIATRIC: NEGATIVE for changes in mood or affect    OBJECTIVE:   Temp 98.4  F (36.9  C) (Temporal)   Resp 24   Ht 1.77 m (5' 9.7\")   Wt (!) 171 kg (377 lb)   BMI 54.56 kg/m    EXAM:  GENERAL: healthy, alert and no distress  EYES: Eyes grossly normal to inspection, PERRL and conjunctivae and sclerae normal  HENT: ear canals and TM's normal, nose and mouth without ulcers or lesions  NECK: no adenopathy, no asymmetry, masses, or scars and thyroid normal to palpation  RESP: lungs clear to auscultation - no rales, rhonchi or wheezes  CV: regular rate and rhythm, normal S1 S2, no S3 or S4, no murmur, click or rub, no peripheral edema and peripheral pulses " "strong  ABDOMEN: soft, nontender, no hepatosplenomegaly, no masses and bowel sounds normal  MS: no gross musculoskeletal defects noted, no edema  SKIN: no suspicious lesions or rashes  NEURO: Normal strength and tone, mentation intact and speech normal  PSYCH: mentation appears normal, affect normal/bright    Diagnostic Test Results:  Labs reviewed in Epic  Results for orders placed or performed in visit on 08/19/20   TSH with free T4 reflex     Status: None   Result Value Ref Range    TSH 1.66 0.40 - 4.00 mU/L       ASSESSMENT/PLAN:   1. Routine general medical examination at a health care facility  See concerns below.    2. Essential hypertension with goal blood pressure less than 140/90  We will continue on with this.  - losartan (COZAAR) 50 MG tablet; Take 1 tablet (50 mg) by mouth daily  Dispense: 90 tablet; Refill: 1  - OFFICE/OUTPT VISIT,EST,LEVL III    3. Morbid obesity (H)  We will just check a thyroid to make sure not missing something here.  He needs to really watch his weight.  Needs to look into dietary consult.  Discussed this with him.  - TSH with free T4 reflex  - OFFICE/OUTPT VISIT,EST,LEVL III    4. Male infertility  He has been referred in the past but did not follow through will make another referral and contact the proper people at the [facility].  - Semen Analysis, Strict Morphology (WALTER); Future  - OFFICE/OUTPT VISIT,EST,LEVL III    COUNSELING:  Reviewed preventive health counseling, as reflected in patient instructions       Regular exercise       Healthy diet/nutrition       Vision screening    Estimated body mass index is 54.56 kg/m  as calculated from the following:    Height as of this encounter: 1.77 m (5' 9.7\").    Weight as of this encounter: 171 kg (377 lb).    Weight management plan: Discussed healthy diet and exercise guidelines     reports that he has quit smoking. He has never used smokeless tobacco.      Counseling Resources:  ATP IV Guidelines  Pooled Cohorts " Equation Calculator  FRAX Risk Assessment  ICSI Preventive Guidelines  Dietary Guidelines for Americans, 2010  USDA's MyPlate  ASA Prophylaxis  Lung CA Screening    Nghia Barrow MD  [facility]

## 2020-08-19 NOTE — PATIENT INSTRUCTIONS
[facility] at [phone]        Preventive Health Recommendations  Male Ages 26 - 39    Yearly exam:             See your health care provider every year in order to  o   Review health changes.   o   Discuss preventive care.    o   Review your medicines if your doctor has prescribed any.    You should be tested each year for STDs (sexually transmitted diseases), if you re at risk.     After age 35, talk to your provider about cholesterol testing. If you are at risk for heart disease, have your cholesterol tested at least every 5 years.     If you are at risk for diabetes, you should have a diabetes test (fasting glucose).  Shots: Get a flu shot each year. Get a tetanus shot every 10 years.     Nutrition:    Eat at least 5 servings of fruits and vegetables daily.     Eat whole-grain bread, whole-wheat pasta and brown rice instead of white grains and rice.     Get adequate Calcium and Vitamin D.     Lifestyle    Exercise for at least 150 minutes a week (30 minutes a day, 5 days a week). This will help you control your weight and prevent disease.     Limit alcohol to one drink per day.     No smoking.     Wear sunscreen to prevent skin cancer.     See your dentist every six months for an exam and cleaning.

## 2020-08-19 NOTE — LETTER
August 20, 2020      José Miguel Dickinson  [address] [address]        Dear ,    We are writing to inform you of your test results.      Thyroid test was normal.     Resulted Orders   TSH with free T4 reflex   Result Value Ref Range    TSH 1.66 0.40 - 4.00 mU/L       If you have any questions or concerns, please call the clinic at the number listed above.       Sincerely,        Nghia Barrow MD

## 2020-10-27 DIAGNOSIS — N46.9 MALE INFERTILITY: ICD-10-CM

## 2020-10-27 PROCEDURE — 89322 SEMEN ANAL STRICT CRITERIA: CPT

## 2020-10-28 LAB
ABNORMAL SPERM: 90 MORPHOLOGY
ABSTINENCE DAYS: 4 DAYS (ref 2–7)
AGGLUTINATION: NO YES/NO
ANALYSIS TEMP - CENTIGRADE: 23 CENTIGRADE
CELL FRAGMENTS: NORMAL %
COLLECTION METHOD: NORMAL
COLLECTION SITE: NORMAL
CONSENT TO RELEASE TO PARTNER: YES
HEAD DEFECT: 91
IMMATURE SPERM: NORMAL %
IMMOTILE: 28 %
LAB RECEIPT TIME: NORMAL
LIQUEFIED: YES YES/NO
MIDPIECE DEFECT: 23
NON-PROGRESSIVE MOTILITY: 1 %
NORMAL SPERM: 10 % NORMAL FORMS (ref 4–?)
PROGRESSIVE MOTILITY: 71 % (ref 32–?)
ROUND CELLS: 0.2 MILLION/ML (ref ?–2)
SPECIMEN CONCENTRATION: 37 MILLION/ML (ref 15–?)
SPECIMEN PH: 7.2 PH (ref 7.2–?)
SPECIMEN TYPE: NORMAL
SPECIMEN VOL UR: 4.3 ML (ref 1.5–?)
TAIL DEFECT: 6
TIME OF ANALYSIS: NORMAL
TOTAL NUMBER: 159 MILLION (ref 39–?)
TOTAL PROGRESSIVE MOTILE: 113 MILLION (ref 15.6–?)
VISCOUS: NO YES/NO
VITALITY: NORMAL % (ref 58–?)
WBC SPECIMEN: NORMAL %

## 2020-11-10 ENCOUNTER — MYC MEDICAL ADVICE (OUTPATIENT)
Dept: FAMILY MEDICINE | Facility: CLINIC | Age: 35
End: 2020-11-10

## 2020-11-16 ENCOUNTER — TELEPHONE (OUTPATIENT)
Dept: FAMILY MEDICINE | Facility: CLINIC | Age: 35
End: 2020-11-16

## 2020-11-16 ENCOUNTER — VIRTUAL VISIT (OUTPATIENT)
Dept: FAMILY MEDICINE | Facility: OTHER | Age: 35
End: 2020-11-16

## 2020-11-16 DIAGNOSIS — N46.9 MALE INFERTILITY: Primary | ICD-10-CM

## 2020-11-16 NOTE — TELEPHONE ENCOUNTER
----- Message from Nghia Barrow MD sent at 11/16/2020  1:17 PM CST -----  In response to your semen analysis those tests are hard to interpret and several of my colleagues thought it was all okay others were not sure think the best thing for you to do as a couple as to do a consult with the infertility specialist now that you have this semen analysis information and they can discuss this with you where to go from here.  We can help you set that up.

## 2020-11-16 NOTE — RESULT ENCOUNTER NOTE
In response to your semen analysis those tests are hard to interpret and several of my colleagues thought it was all okay others were not sure think the best thing for you to do as a couple as to do a consult with the infertility specialist now that you have this semen analysis information and they can discuss this with you where to go from here.  We can help you set that up.

## 2020-11-16 NOTE — PROGRESS NOTES
"Date: 2020 14:02:55  Clinician: Isaiah Hargrove  Clinician NPI: [id number]  Patient: José Miguel Dickinson  Patient : 1985  Patient Address: [address] [place] [address]  Patient Phone: [phone]  Visit Protocol: URI  Patient Summary:  José Miguel is a 35 year old ( : 1985 ) male who initiated a [facility] Visit for COVID-19 (Coronavirus) evaluation and screening. When asked the question \"Please sign me up to receive news, health information and promotions. \", José Miguel responded \"Yes\".    José Miguel states his symptoms started suddenly 3-4 days ago.   His symptoms consist of rhinitis, facial pain or pressure, myalgia, malaise, a sore throat, wheezing, a cough, and nasal congestion. He is experiencing difficulty breathing due to nasal congestion but he is not short of breath. José Miguel also feels feverish.   Symptom details     Nasal secretions: The color of his mucus is yellow, white, and clear.    Cough: José Miguel coughs a few times an hour and his cough is more bothersome at night. Phlegm comes into his throat when he coughs. He believes his cough is caused by post-nasal drip. The color of the phlegm is white, yellow, and clear.     Sore throat: José Miguel reports having mild throat pain (1-3 on a 10 point pain scale), does not have exudate on his tonsils, and can swallow liquids. He is not sure if the lymph nodes in his neck are enlarged. A rash has not appeared on the skin since the sore throat started.     Temperature: His current temperature is 99.1 degrees Fahrenheit.     Wheezing: José Miguel has not ever been diagnosed with asthma. Additional wheezing details as reported by the patient (free text): It hasn't really I can hear I when I breath with my mouth open.       Facial pain or pressure: The facial pain or pressure feels worse when bending over or leaning forward.      José Miguel denies having vomiting, chills, teeth pain, ageusia, diarrhea, ear pain, headache, nausea, and anosmia. He also " denies taking antibiotic medication in the past month, having recent facial or sinus surgery in the past 60 days, double sickening (worsening symptoms after initial improvement), and having a sinus infection within the past year.   Precipitating events  José Miguel is not sure if he has been exposed to someone with strep throat. He has not recently been exposed to someone with influenza. José Miguel has been in close contact with the following high risk individuals: adults 65 or older and children under the age of 5.   Pertinent COVID-19 (Coronavirus) information  José Miguel does not work or volunteer as healthcare worker or a . In the past 14 days, José Miguel has not worked or volunteered at a healthcare facility or group living setting.   In the past 14 days, he also has not lived in a congregate living setting.   José Miguel has had a close contact with a laboratory-confirmed COVID-19 patient within 14 days of symptom onset. He was exposed at his work. He does not know when he was exposed to the laboratory-confirmed COVID-19 patient.   Additional information about contact with COVID-19 (Coronavirus) patient as reported by the patient (free text): Supervisor tested positive Saturday11/14 but worked with her11/9-11/12 at various intervals but none exceeding 15 minutes all while masked.    Since December 2019, José Miguel has been tested for COVID-19 and has not had upper respiratory infection or influenza-like illness.      Result of COVID-19 test: Negative     Date of his COVID-19 test: 09/09/2020      Pertinent medical history  José Miguel needs a return to work/school note.   Weight: 388 lbs   José Miguel does not smoke or use smokeless tobacco.   Additional information as reported by the patient (free text): Need a covid test and results to be able to go back to work.   Weight: 388 lbs    MEDICATIONS: ibuprofen (bulk), Glucosamine-Chondroitin-MSM Complex oral, One-A-Day Men's Complete oral, omeprazole oral, losartan  oral, ALLERGIES: Augmentin  Clinician Response:  Dear José Miguel,   Your symptoms show that you may have coronavirus (COVID-19). This illness can cause fever, cough and trouble breathing. Many people get a mild case and get better on their own. Some people can get very sick.  What should I do?  We would like to test you for this virus.   1. Please call [phone] to schedule your visit. Explain that you were referred by [facility] to have a COVID-19 test. Be ready to share your [facility] visit ID number.  * If you need to schedule in [facility] please call [phone] or for [employer] employees please call [phone].  * If you need to schedule in the [place] area please call [phone]. [employer] employees call [phone].  The following will serve as your written order for this COVID Test, ordered by me, for the indication of suspected COVID [Z20.828]: The test will be ordered in [employer], our electronic health record, after you are scheduled. It will show as ordered and authorized by Jagdeep Zhao MD.  Order: COVID-19 (Coronavirus) PCR for SYMPTOMATIC testing from [facility].   2. When it's time for your COVID test:  Stay at least 6 feet away from others. (If someone will drive you to your test, stay in the backseat, as far away from the  as you can.)   Cover your mouth and nose with a mask, tissue or washcloth.  Go straight to the testing site. Don't make any stops on the way there or back.      3.Starting now: Stay home and away from others (self-isolate) until:   You've had no fever---and no medicine that reduces fever---for one full day (24 hours). And...   Your other symptoms have gotten better. For example, your cough or breathing has improved. And...   At least 10 days have passed since your symptoms started.       During this time, don't leave the house except for testing or medical care.   Stay in your own room, even for meals. Use your own bathroom if you can.   Stay away from others in your home. No  "hugging, kissing or shaking hands. No visitors.  Don't go to work, school or anywhere else.    Clean \"high touch\" surfaces often (doorknobs, counters, handles, etc.). Use a household cleaning spray or wipes. You'll find a full list of  on the EPA website: www.epa.gov/pesticide-registration/list-n-disinfectants-use-against-sars-cov-2.   Cover your mouth and nose with a mask, tissue or washcloth to avoid spreading germs.  Wash your hands and face often. Use soap and water.  Caregivers in these groups are at risk for severe illness due to COVID-19:  o People 65 years and older  o People who live in a nursing home or long-term care facility  o People with chronic disease (lung, heart, cancer, diabetes, kidney, liver, immunologic)  o People who have a weakened immune system, including those who:   Are in cancer treatment  Take medicine that weakens the immune system, such as corticosteroids  Had a bone marrow or organ transplant  Have an immune deficiency  Have poorly controlled HIV or AIDS  Are obese (body mass index of 40 or higher)  Smoke regularly   o Caregivers should wear gloves while washing dishes, handling laundry and cleaning bedrooms and bathrooms.  o Use caution when washing and drying laundry: Don't shake dirty laundry, and use the warmest water setting that you can.  o For more tips, go to www.cdc.gov/coronavirus/2019-ncov/downloads/10Things.pdf.    How can I take care of myself?    Get lots of rest. Drink extra fluids (unless a doctor has told you not to).   Take Tylenol (acetaminophen) for fever or pain. If you have liver or kidney problems, ask your family doctor if it's okay to take Tylenol.   Adults can take either:    650 mg (two 325 mg pills) every 4 to 6 hours, or...   1,000 mg (two 500 mg pills) every 8 hours as needed.    Note: Don't take more than 3,000 mg in one day. Acetaminophen is found in many medicines (both prescribed and over-the-counter medicines). Read all labels to be sure you " don't take too much.   For children, check the Tylenol bottle for the right dose. The dose is based on the child's age or weight.    If you have other health problems (like cancer, heart failure, an organ transplant or severe kidney disease): Call your specialty clinic if you don't feel better in the next 2 days.       Know when to call 911. Emergency warning signs include:    Trouble breathing or shortness of breath Pain or pressure in the chest that doesn't go away Feeling confused like you haven't felt before, or not being able to wake up Bluish-colored lips or face.  Where can I get more information?   [facility] -- About COVID-19: [email or web address]   CDC -- What to Do If You're Sick: www.cdc.gov/coronavirus/2019-ncov/about/steps-when-sick.html   [facility] -- Ending Home Isolation: www.cdc.gov/coronavirus/2019-ncov/hcp/disposition-in-home-patients.html   [facility] -- Caring for Someone: www.cdc.gov/coronavirus/2019-ncov/if-you-are-sick/care-for-someone.html   [facility] -- Interim Guidance for Hospital Discharge to Home: www.[facility].[place]/diseases/coronavirus/hcp/hospdischarge.pdf   [facility] clinical trials (COVID-19 research studies): clinicalaffairs.[facility]/n-clinical-trials    Below are the COVID-19 hotlines at the [facility] ([facility]). Interpreters are available.    For health questions: Call 797-562-2581 or 1-209.873.3565 (7 a.m. to 7 p.m.) For questions about schools and childcare: Call 836-153-0304 or 1-512.123.9021 (7 a.m. to 7 p.m.)    Diagnosis: Contact with and (suspected) exposure to other viral communicable diseases  Diagnosis ICD: Z20.828

## 2020-11-18 DIAGNOSIS — Z20.822 SUSPECTED 2019 NOVEL CORONAVIRUS INFECTION: ICD-10-CM

## 2020-11-18 DIAGNOSIS — Z20.822 SUSPECTED 2019 NOVEL CORONAVIRUS INFECTION: Primary | ICD-10-CM

## 2020-11-18 PROCEDURE — U0003 INFECTIOUS AGENT DETECTION BY NUCLEIC ACID (DNA OR RNA); SEVERE ACUTE RESPIRATORY SYNDROME CORONAVIRUS 2 (SARS-COV-2) (CORONAVIRUS DISEASE [COVID-19]), AMPLIFIED PROBE TECHNIQUE, MAKING USE OF HIGH THROUGHPUT TECHNOLOGIES AS DESCRIBED BY CMS-2020-01-R: HCPCS | Performed by: FAMILY MEDICINE

## 2020-11-19 LAB
SARS-COV-2 RNA SPEC QL NAA+PROBE: ABNORMAL
SPECIMEN SOURCE: ABNORMAL

## 2020-11-19 NOTE — TELEPHONE ENCOUNTER
The place we were going to send this patient doesn't see males only and I called a few more and can't find any unless it is a couple.  José Miguel said yes this will be for a couple.  I am faxing this again.

## 2020-12-03 ENCOUNTER — MYC MEDICAL ADVICE (OUTPATIENT)
Dept: FAMILY MEDICINE | Facility: CLINIC | Age: 35
End: 2020-12-03

## 2021-01-09 ENCOUNTER — HEALTH MAINTENANCE LETTER (OUTPATIENT)
Age: 36
End: 2021-01-09

## 2021-04-01 DIAGNOSIS — I10 ESSENTIAL HYPERTENSION WITH GOAL BLOOD PRESSURE LESS THAN 140/90: ICD-10-CM

## 2021-04-05 RX ORDER — LOSARTAN POTASSIUM 50 MG/1
TABLET ORAL
Qty: 90 TABLET | Refills: 1 | Status: SHIPPED | OUTPATIENT
Start: 2021-04-05 | End: 2021-12-22

## 2021-04-05 NOTE — TELEPHONE ENCOUNTER
Routing refill request to provider for review/approval because:  Labs out of range:  BP  Labs not current:  Creatinine, Potassium    ELLIE Rose, RN  [facility]

## 2021-10-23 ENCOUNTER — HEALTH MAINTENANCE LETTER (OUTPATIENT)
Age: 36
End: 2021-10-23

## 2021-12-20 DIAGNOSIS — I10 ESSENTIAL HYPERTENSION WITH GOAL BLOOD PRESSURE LESS THAN 140/90: ICD-10-CM

## 2021-12-22 RX ORDER — LOSARTAN POTASSIUM 50 MG/1
50 TABLET ORAL DAILY
Qty: 30 TABLET | Refills: 0 | Status: SHIPPED | OUTPATIENT
Start: 2021-12-22 | End: 2022-02-08

## 2021-12-22 NOTE — TELEPHONE ENCOUNTER
Cozaar  Routing refill request to provider for review/approval because:  Labs not current:  CR, Potassium  Patient needs to be seen because it has been more than 1 year since last office visit.  BP failed RN refill protocol    Sending to scheduling for yearly office visit due    Patt Magana RN

## 2022-02-06 DIAGNOSIS — I10 ESSENTIAL HYPERTENSION WITH GOAL BLOOD PRESSURE LESS THAN 140/90: ICD-10-CM

## 2022-02-08 RX ORDER — LOSARTAN POTASSIUM 50 MG/1
TABLET ORAL
Qty: 30 TABLET | Refills: 0 | Status: SHIPPED | OUTPATIENT
Start: 2022-02-08 | End: 2022-03-28

## 2022-02-08 NOTE — TELEPHONE ENCOUNTER
Pending Prescriptions:                       Disp   Refills    losartan (COZAAR) 50 MG tablet [Pharmacy M*30 tab*0        Sig: TAKE ONE TABLET BY MOUTH ONCE DAILY      Routing refill request to provider for review/approval because:  Lucinda given x1 and patient did not follow up, please advise    Maribel Johnson RN

## 2022-03-27 DIAGNOSIS — I10 ESSENTIAL HYPERTENSION WITH GOAL BLOOD PRESSURE LESS THAN 140/90: ICD-10-CM

## 2022-03-28 RX ORDER — LOSARTAN POTASSIUM 50 MG/1
TABLET ORAL
Qty: 30 TABLET | Refills: 0 | Status: SHIPPED | OUTPATIENT
Start: 2022-03-28 | End: 2022-05-06

## 2022-03-28 NOTE — TELEPHONE ENCOUNTER
Routing refill request to provider for review/approval because:  Lucinda given x1 and patient did not follow up, please advise  Labs not current:  BP, potassium, creatinine    JORDEN GongoraN, RN

## 2022-05-05 DIAGNOSIS — I10 ESSENTIAL HYPERTENSION WITH GOAL BLOOD PRESSURE LESS THAN 140/90: ICD-10-CM

## 2022-05-06 RX ORDER — LOSARTAN POTASSIUM 50 MG/1
TABLET ORAL
Qty: 30 TABLET | Refills: 0 | Status: SHIPPED | OUTPATIENT
Start: 2022-05-06 | End: 2022-06-28

## 2022-05-06 NOTE — TELEPHONE ENCOUNTER
Routing refill request to provider for review/approval because:  Labs not current:  CRE, K+  Patient needs to be seen because it has been more than 1 year since last office visit.  BP elevated      Leny Gómez RN

## 2022-06-24 DIAGNOSIS — I10 ESSENTIAL HYPERTENSION WITH GOAL BLOOD PRESSURE LESS THAN 140/90: ICD-10-CM

## 2022-06-28 ENCOUNTER — MYC MEDICAL ADVICE (OUTPATIENT)
Dept: FAMILY MEDICINE | Facility: CLINIC | Age: 37
End: 2022-06-28

## 2022-06-28 RX ORDER — LOSARTAN POTASSIUM 50 MG/1
50 TABLET ORAL DAILY
Qty: 15 TABLET | Refills: 0 | Status: SHIPPED | OUTPATIENT
Start: 2022-06-28 | End: 2022-08-25

## 2022-06-28 NOTE — LETTER
[address] [address]  Phone: [phone]  Fax: [phone]      July 1, 2022      José Miguel GARY Teena                                                                                                                                [address] [address]        Dear Mr. Dickinson,    We are concerned about your health care.  We recently provided you with a medication refill.  Many medications require routine follow-up with your Doctor.       At this time we ask that: You schedule an appointment for your annual physical. Call the clinic at [phone] Option 1 to schedule.      Your prescription:  (Cozaar) Has been refilled for 1 month so you may have time for the above noted follow-up.        Thank you,     Nghia Barrow MD  Care Team

## 2022-08-25 ENCOUNTER — TELEPHONE (OUTPATIENT)
Dept: FAMILY MEDICINE | Facility: CLINIC | Age: 37
End: 2022-08-25

## 2022-08-25 ENCOUNTER — OFFICE VISIT (OUTPATIENT)
Dept: FAMILY MEDICINE | Facility: CLINIC | Age: 37
End: 2022-08-25
Payer: COMMERCIAL

## 2022-08-25 VITALS
OXYGEN SATURATION: 97 % | DIASTOLIC BLOOD PRESSURE: 80 MMHG | HEIGHT: 70 IN | BODY MASS INDEX: 45.1 KG/M2 | SYSTOLIC BLOOD PRESSURE: 160 MMHG | HEART RATE: 76 BPM | TEMPERATURE: 97.3 F | WEIGHT: 315 LBS | RESPIRATION RATE: 20 BRPM

## 2022-08-25 DIAGNOSIS — E66.01 MORBID OBESITY (H): ICD-10-CM

## 2022-08-25 DIAGNOSIS — R73.09 ELEVATED GLUCOSE: ICD-10-CM

## 2022-08-25 DIAGNOSIS — I10 ESSENTIAL HYPERTENSION WITH GOAL BLOOD PRESSURE LESS THAN 140/90: Primary | ICD-10-CM

## 2022-08-25 LAB
ALBUMIN SERPL-MCNC: 3.4 G/DL (ref 3.4–5)
ALP SERPL-CCNC: 74 U/L (ref 40–150)
ALT SERPL W P-5'-P-CCNC: 42 U/L (ref 0–70)
ANION GAP SERPL CALCULATED.3IONS-SCNC: 3 MMOL/L (ref 3–14)
AST SERPL W P-5'-P-CCNC: 19 U/L (ref 0–45)
BILIRUB SERPL-MCNC: 0.4 MG/DL (ref 0.2–1.3)
BUN SERPL-MCNC: 14 MG/DL (ref 7–30)
CALCIUM SERPL-MCNC: 9 MG/DL (ref 8.5–10.1)
CHLORIDE BLD-SCNC: 105 MMOL/L (ref 94–109)
CO2 SERPL-SCNC: 30 MMOL/L (ref 20–32)
CREAT SERPL-MCNC: 0.72 MG/DL (ref 0.66–1.25)
GFR SERPL CREATININE-BSD FRML MDRD: >90 ML/MIN/1.73M2
GLUCOSE BLD-MCNC: 130 MG/DL (ref 70–99)
HBA1C MFR BLD: 6 % (ref 0–5.6)
POTASSIUM BLD-SCNC: 4.1 MMOL/L (ref 3.4–5.3)
PROT SERPL-MCNC: 7.1 G/DL (ref 6.8–8.8)
SODIUM SERPL-SCNC: 138 MMOL/L (ref 133–144)

## 2022-08-25 PROCEDURE — 36415 COLL VENOUS BLD VENIPUNCTURE: CPT | Performed by: FAMILY MEDICINE

## 2022-08-25 PROCEDURE — 80053 COMPREHEN METABOLIC PANEL: CPT | Performed by: FAMILY MEDICINE

## 2022-08-25 PROCEDURE — 99214 OFFICE O/P EST MOD 30 MIN: CPT | Performed by: FAMILY MEDICINE

## 2022-08-25 PROCEDURE — 83036 HEMOGLOBIN GLYCOSYLATED A1C: CPT | Performed by: FAMILY MEDICINE

## 2022-08-25 RX ORDER — LOSARTAN POTASSIUM 50 MG/1
50 TABLET ORAL DAILY
Qty: 90 TABLET | Refills: 1 | Status: SHIPPED | OUTPATIENT
Start: 2022-08-25 | End: 2024-04-09

## 2022-08-25 ASSESSMENT — PAIN SCALES - GENERAL: PAINLEVEL: NO PAIN (0)

## 2022-08-25 NOTE — TELEPHONE ENCOUNTER
I called this patient with the following per Dr. Barrow;  chemistry panel is fine except his blood sugar is 130 and his hemoglobin A1c is 6.0.  So he is in the prediabetic range.  Needs to really work on weight loss and watching carbohydrates and should probably recheck this in 6 months to a year

## 2022-08-25 NOTE — TELEPHONE ENCOUNTER
----- Message from Nghia Barrow MD sent at 8/25/2022 11:45 AM CDT -----  's chemistry panel is fine except his blood sugar is 130 and his hemoglobin A1c is 6.0.  So he is in the prediabetic range.  Needs to really work on weight loss and watching carbohydrates and should probably recheck this in 6 months to a year

## 2022-08-25 NOTE — PROGRESS NOTES
"  Assessment & Plan     Essential hypertension with goal blood pressure less than 140/90  This is renewed.  His blood pressures at home have been better he is to watch them closely.  And strictly work on weight loss and cutting down on salt in the diet.  - losartan (COZAAR) 50 MG tablet; Take 1 tablet (50 mg) by mouth daily .  - Comprehensive metabolic panel (BMP + Alb, Alk Phos, ALT, AST, Total. Bili, TP); Future  - Comprehensive metabolic panel (BMP + Alb, Alk Phos, ALT, AST, Total. Bili, TP)    Morbid obesity (H)  Needs weight loss because of his elevated blood sugar and hypertension.  Discussed consideration of bariatric surgery with him.    Elevated glucose  Notified that his blood sugar was 130 his hemoglobin A1c is 6.0.  1 recheck this in about 6 months and again encouraged diet and weight loss.  - Hemoglobin A1c; Future  - Hemoglobin A1c             BMI:   Estimated body mass index is 59.97 kg/m  as calculated from the following:    Height as of this encounter: 1.765 m (5' 9.5\").    Weight as of this encounter: 186.9 kg (412 lb).   Weight management plan: Discussed healthy diet and exercise guidelines        No follow-ups on file.    Nghia Barrow MD  [facility] JULIA Valdez is a 36 year old, presenting for the following health issues:  Hypertension  Follow-up his hypertension and needs lab work.  He has been feeling okay.  He has gained weight.  He states he wants to start working on this.    History of Present Illness       Hypertension: He presents for follow up of hypertension.  He does not check blood pressure  regularly outside of the clinic. Outside blood pressures have been over 140/90. He follows a low salt diet.               Review of Systems   Constitutional, HEENT, cardiovascular, pulmonary, gi and gu systems are negative, except as otherwise noted.      Objective    BP (!) 160/80 (BP Location: Right arm, Patient Position: Chair, Cuff Size: Adult Large)  " " Pulse 76   Temp 97.3  F (36.3  C) (Temporal)   Resp 20   Ht 1.765 m (5' 9.5\")   Wt (!) 186.9 kg (412 lb)   SpO2 97%   BMI 59.97 kg/m    Body mass index is 59.97 kg/m .  Physical Exam   GENERAL: healthy, alert and no distress  NECK: no adenopathy, no asymmetry, masses, or scars and thyroid normal to palpation  RESP: lungs clear to auscultation - no rales, rhonchi or wheezes  CV: regular rate and rhythm, normal S1 S2, no S3 or S4, no murmur, click or rub, no peripheral edema and peripheral pulses strong  MS: no gross musculoskeletal defects noted, no edema  PSYCH: mentation appears normal, affect normal/bright                    .  ..  "

## 2022-10-09 ENCOUNTER — HEALTH MAINTENANCE LETTER (OUTPATIENT)
Age: 37
End: 2022-10-09

## 2022-11-26 ENCOUNTER — HEALTH MAINTENANCE LETTER (OUTPATIENT)
Age: 37
End: 2022-11-26

## 2023-04-19 ENCOUNTER — NURSE TRIAGE (OUTPATIENT)
Dept: FAMILY MEDICINE | Facility: CLINIC | Age: 38
End: 2023-04-19
Payer: COMMERCIAL

## 2023-04-19 NOTE — TELEPHONE ENCOUNTER
Patient advised to seek care today.  Schedules at [facility] do not have any openings for a provider visit at this time.  Advised patient to seek urgent care at this time.  Advised patient of emergency care for worsening symptoms.  Patient stated understanding and stated he would go to Urgent Care in [place], [place] at this time.     Reason for Disposition    Swelling is red and fever    Additional Information    Negative: Passed out (i.e., fainted, collapsed and was not responding)    Negative: Shock suspected (e.g., cold/pale/clammy skin, too weak to stand, low BP, rapid pulse)    Negative: Sounds like a life-threatening emergency to the triager    Negative: Major injury to the back (e.g., MVA, fall > 10 feet or 3 meters, penetrating injury, etc.)    Negative: Pain in the upper back over the ribs (rib cage) that radiates (travels) into the chest    Negative: Pain in the upper back over the ribs (rib cage) and worsened by coughing (or clearly increases with breathing)    Negative: Back pain during pregnancy    Negative: SEVERE back pain of sudden onset and age > 60 years    Negative: SEVERE abdominal pain (e.g., excruciating)    Negative: Abdominal pain and age > 60 years    Negative: Unable to urinate (or only a few drops) and bladder feels very full    Negative: Loss of bladder or bowel control (urine or bowel incontinence; wetting self, leaking stool) of new-onset    Negative: Numbness (loss of sensation) in groin or rectal area    Negative: Small growth, spot, bump, or pigmented area of skin (e.g., moles, skin tags, wart, melanoma, skin cancer)    Negative: Followed a skin injury    Negative: Follows an insect bite    Negative: Swelling of lymph node suspected    Negative: Swelling of vaccination site    Negative: Swelling of entire face    Negative: Swelling of scrotum    Negative: Swelling of labia    Negative: Swelling of surgical incision    Negative: Swelling with a skin rash    Negative: Hernia  "suspected (bulge in groin or abdomen) and painful or vomiting    Negative: Swollen lump in groin and pulsating (like heartbeat)    Negative: Patient sounds very sick or weak to the triager    Negative: SEVERE pain (e.g., excruciating)    Negative: Swelling is painful to touch AND fever    Answer Assessment - Initial Assessment Questions  1. ONSET: \"When did the pain begin?\"       Approximately 3 days ago    2. LOCATION: \"Where does it hurt?\" (upper, mid or lower back)      Upper back can spread down to legs when muscles tighten    3. SEVERITY: \"How bad is the pain?\"  (e.g., Scale 1-10; mild, moderate, or severe)    - MILD (1-3): doesn't interfere with normal activities     - MODERATE (4-7): interferes with normal activities or awakens from sleep     - SEVERE (8-10): excruciating pain, unable to do any normal activities       6-7/10    4. PATTERN: \"Is the pain constant?\" (e.g., yes, no; constant, intermittent)       Constant if not taking ibuprofen    5. RADIATION: \"Does the pain shoot into your legs or elsewhere?\"      Legs, sometimes rotator cuff    6. CAUSE:  \"What do you think is causing the back pain?\"       Woke up with what looked like an ingrown hair two days ago, last night awoke at 2am with chills, and back was sore - lump, red, size of golfball    7. BACK OVERUSE:  \"Any recent lifting of heavy objects, strenuous work or exercise?\"      NA    8. MEDICATIONS: \"What have you taken so far for the pain?\" (e.g., nothing, acetaminophen, NSAIDS)      Ibuprofen    9. NEUROLOGIC SYMPTOMS: \"Do you have any weakness, numbness, or problems with bowel/bladder control?\"      No    10. OTHER SYMPTOMS: \"Do you have any other symptoms?\" (e.g., fever, abdominal pain, burning with urination, blood in urine)        Fever 101 F    11. PREGNANCY: \"Is there any chance you are pregnant?\" (e.g., yes, no; LMP)        NA    Answer Assessment - Initial Assessment Questions  Please see back pain documentation    Protocols used: SKIN " LUMP OR LOCALIZED SWELLING-A-OH, BACK PAIN-A-OH  Patt Magana RN

## 2023-05-16 ENCOUNTER — E-VISIT (OUTPATIENT)
Dept: URGENT CARE | Facility: CLINIC | Age: 38
End: 2023-05-16
Payer: COMMERCIAL

## 2023-05-16 DIAGNOSIS — J06.9 VIRAL URI WITH COUGH: Primary | ICD-10-CM

## 2023-05-16 PROCEDURE — 99421 OL DIG E/M SVC 5-10 MIN: CPT | Performed by: PHYSICIAN ASSISTANT

## 2023-05-16 RX ORDER — ALBUTEROL SULFATE 90 UG/1
2 AEROSOL, METERED RESPIRATORY (INHALATION) EVERY 6 HOURS
Qty: 18 G | Refills: 0 | Status: SHIPPED | OUTPATIENT
Start: 2023-05-16 | End: 2024-04-09

## 2023-05-16 RX ORDER — BENZONATATE 100 MG/1
100 CAPSULE ORAL 3 TIMES DAILY PRN
Qty: 30 CAPSULE | Refills: 0 | Status: SHIPPED | OUTPATIENT
Start: 2023-05-16 | End: 2024-04-09

## 2023-05-16 NOTE — PATIENT INSTRUCTIONS
"  Dear José Miguel Dickinson    After reviewing your responses, I've been able to diagnose you with \"Bronchitis\" which is a common infection of your lungs that is nearly always caused by a virus. The virus causes swelling and irritation of the air passages of your lungs which leads to cough. The illness spreads from your nose and throat to your windpipe and airways. It is often called a \"chest cold\" and can last up to 2 weeks, but is not a serious illness. Exposure to cigarette smoke usually makes this significantly worse.      To treat bronchitis, the main thing to do is drink lots of fluids and rest. Cough medications over-the-counter such as mucinex, robitussin or \"cold and sinus\" medications can be helpful. Ibuprofen and Tylenol also help with fevers or aching feelings that you often have with this kind of illness. Do not take ibuprofen if you have kidney disease, stomach ulcers or allergy to aspirin.     Bronchitis is most often highly contagious as viruses are spread through the air or touch. Avoid contact with others who may become infected, particularly children, the elderly and those whose immune systems might be weak.     If your symptoms worsen, you develop chest pain or shortness of breath, fevers over 101, or are not improving in 5 days, please contact your primary care provider for an appointment or visit any of our convenient Walk-in Care or Urgent Care Centers to be seen which can be found on our website here.    Thanks again for choosing us as your health care partner,    Isaiah Hargrove [facility], PA-C  "

## 2024-01-06 ENCOUNTER — HEALTH MAINTENANCE LETTER (OUTPATIENT)
Age: 39
End: 2024-01-06

## 2024-04-09 ENCOUNTER — OFFICE VISIT (OUTPATIENT)
Dept: FAMILY MEDICINE | Facility: CLINIC | Age: 39
End: 2024-04-09
Payer: COMMERCIAL

## 2024-04-09 VITALS
SYSTOLIC BLOOD PRESSURE: 136 MMHG | WEIGHT: 315 LBS | DIASTOLIC BLOOD PRESSURE: 78 MMHG | OXYGEN SATURATION: 96 % | HEART RATE: 88 BPM | RESPIRATION RATE: 14 BRPM | BODY MASS INDEX: 45.1 KG/M2 | TEMPERATURE: 97.4 F | HEIGHT: 70 IN

## 2024-04-09 DIAGNOSIS — H65.23 BILATERAL CHRONIC SEROUS OTITIS MEDIA: Primary | ICD-10-CM

## 2024-04-09 PROCEDURE — 99213 OFFICE O/P EST LOW 20 MIN: CPT | Performed by: NURSE PRACTITIONER

## 2024-04-09 RX ORDER — LEVOCETIRIZINE DIHYDROCHLORIDE 5 MG/1
5 TABLET, FILM COATED ORAL EVERY EVENING
Qty: 90 TABLET | Refills: 3 | Status: SHIPPED | OUTPATIENT
Start: 2024-04-09

## 2024-04-09 RX ORDER — AZELASTINE 1 MG/ML
1 SPRAY, METERED NASAL 2 TIMES DAILY
Qty: 30 ML | Refills: 3 | Status: SHIPPED | OUTPATIENT
Start: 2024-04-09

## 2024-04-09 ASSESSMENT — PAIN SCALES - GENERAL: PAINLEVEL: NO PAIN (0)

## 2024-04-09 NOTE — PROGRESS NOTES
"  Assessment & Plan     Bilateral chronic serous otitis media    - levocetirizine (XYZAL) 5 MG tablet; Take 1 tablet (5 mg) by mouth every evening  - azelastine (ASTELIN) 0.1 % nasal spray; Spray 1 spray into both nostrils 2 times daily  - Adult ENT  Referral; Future    Acetaminophen or ibuprofen as needed for pain.    Hot pack to the affected ear.    Decongestants and needed for fluid present, monitor for increased blood pressure with this medication.  Follow up if symptoms persist or worsen, or sooner with any questions or concerns.     I explained my diagnostic considerations and recommendations to the patient, who voiced understanding and agreement with the assessment and treatment plan. All questions were answered to patient's apparent satisfaction. We discussed potential side effects of any prescribed or recommended therapies, as well as expectations for response to treatments and importance of lifestyle measures that may improve symptoms. Patient was advised to contact our office if there are new symptoms or no improvement or worsening of conditions or symptoms.              BMI  Estimated body mass index is 62.13 kg/m  as calculated from the following:    Height as of this encounter: 1.778 m (5' 10\").    Weight as of this encounter: 196.4 kg (433 lb).             James Spann is a 38 year old, presenting for the following health issues:  Ear Problem      4/9/2024     7:13 AM   Additional Questions   Roomed by Ady Blum CMA     History of Present Illness       Reason for visit:  Ears feel like they have fluid and wont fully drain  Symptom onset:  More than a month  Symptoms include:  Hard to hear, popping in ear, can feel drain kathia at times but never fully drains  Symptom intensity:  Mild  Symptom progression:  Staying the same  Had these symptoms before:  Yes  Has tried/received treatment for these symptoms:  Yes  Previous treatment was successful:  No  What makes it worse:  Sleeping  What " makes it better:  No    He eats 2-3 servings of fruits and vegetables daily.He consumes 1 sweetened beverage(s) daily.He exercises with enough effort to increase his heart rate 9 or less minutes per day.  He exercises with enough effort to increase his heart rate 3 or less days per week. He is missing 1 dose(s) of medications per week.  He is not taking prescribed medications regularly due to remembering to take.       SUBJECTIVE: José Miguel Dickinson is a 38 year old male patient who presents to the clinic today for evaluation of 3 months worth of bilateral ear symptoms. He reports intermittent feelings of popping, decreased hearing, and drainage worse in the right ear than the left. He has had symptoms similar to this in the past, but notes it has been worse since having two ear infections in November and December, 2024.He has tried OTC ear candles and . He has found laying down to worsen his symptoms more than standing. He does also note increased sinus symptoms of late and allergies in his home. He has used OTC Flonase in the past but felt this affected his taste. He has also used OTC Claritin which did help his allergies but not his ear symptoms.       Associated symptoms:   Fever:  No    Sore throat: No   Cough: No   Teeth pain: No   Headache: No   Post Nasal Drainage: Occasional   Nasal Congestion: Occasional     Patient Active Problem List   Diagnosis    Morbid obesity (H)    Ingrowing nail    CARDIOVASCULAR SCREENING; LDL GOAL LESS THAN 160    Esophageal reflux    Essential hypertension with goal blood pressure less than 140/90     Current Outpatient Medications   Medication Sig Dispense Refill    azelastine (ASTELIN) 0.1 % nasal spray Spray 1 spray into both nostrils 2 times daily 30 mL 3    levocetirizine (XYZAL) 5 MG tablet Take 1 tablet (5 mg) by mouth every evening 90 tablet 3    omeprazole (PRILOSEC) 20 MG CR capsule TAKE 1 CAPSULE (20 MG) BY MOUTH 2 TIMES DAILY 60 capsule 3     No current  "facility-administered medications for this visit.         Review of Systems  Constitutional, HEENT, cardiovascular, pulmonary, gi and gu systems are negative, except as otherwise noted.      Objective    /78 (BP Location: Left arm, Patient Position: Chair, Cuff Size: Adult Large)   Pulse 88   Temp 97.4  F (36.3  C) (Temporal)   Resp 14   Ht 1.778 m (5' 10\")   Wt (!) 196.4 kg (433 lb)   SpO2 96%   BMI 62.13 kg/m    Body mass index is 62.13 kg/m .  Physical Exam  Vitals reviewed.   Constitutional:       General: He is not in acute distress.     Appearance: Normal appearance.   HENT:      Right Ear: Tympanic membrane, ear canal and external ear normal.      Left Ear: Tympanic membrane, ear canal and external ear normal.      Ears:      Comments: Bilateral TM bulging with clear fluid present     Nose: Congestion present.      Mouth/Throat:      Mouth: Mucous membranes are moist.      Pharynx: Oropharynx is clear.   Eyes:      Extraocular Movements: Extraocular movements intact.      Conjunctiva/sclera: Conjunctivae normal.   Cardiovascular:      Rate and Rhythm: Normal rate and regular rhythm.      Heart sounds: Normal heart sounds.   Pulmonary:      Effort: Pulmonary effort is normal.      Breath sounds: Normal breath sounds.   Musculoskeletal:      Cervical back: Neck supple.   Lymphadenopathy:      Cervical: No cervical adenopathy.   Skin:     General: Skin is warm and dry.   Neurological:      Mental Status: He is alert and oriented to person, place, and time. Mental status is at baseline.   Psychiatric:         Mood and Affect: Mood normal.         Behavior: Behavior normal.                    Signed Electronically by: Ivelisse Heaton NP    "

## 2024-08-14 NOTE — PROGRESS NOTES
ENT Consultation    José Miguel Dickinson who is a 38 year old male seen in consultation at the request of Ivelisse Heaton NP.      History of Present Illness - José Miguel Dickinson is a 38 year old male ear issues    Apparently patient had the ear infection along his right ear about 10 months ago.  Since then his ear has been feeling residual plugging sensation pressure.  Pops occasionally but then plucks right up.  He has nasal congestion And this may be couple times a week.  He may have some spring allergies even though was tested years ago and was negative at that time.  He has tried nasal steroid sprays without much relief.  Feels that his lower tones are down on the right as compared to the left.  BP Readings from Last 1 Encounters:   08/29/24 (!) 142/92       BP noted to be elevated today in office.  Patient to follow up with Primary Care provider regarding elevated blood pressure.    José Miguel IS NOT a smoker/uses chewing tobacco.      Past Medical History - No past medical history on file.    Current Medications -   Current Outpatient Medications:     azelastine (ASTELIN) 0.1 % nasal spray, Spray 1 spray into both nostrils 2 times daily, Disp: 30 mL, Rfl: 3    levocetirizine (XYZAL) 5 MG tablet, Take 1 tablet (5 mg) by mouth every evening, Disp: 90 tablet, Rfl: 3    omeprazole (PRILOSEC) 20 MG CR capsule, TAKE 1 CAPSULE (20 MG) BY MOUTH 2 TIMES DAILY (Patient taking differently: 20 mg daily.), Disp: 60 capsule, Rfl: 3    Allergies -   Allergies   Allergen Reactions    Adhesive Tape      silk tape    Amoxicillin-Pot Clavulanate Rash       Social History -   Social History     Socioeconomic History    Marital status:    Tobacco Use    Smoking status: Former    Smokeless tobacco: Never   Vaping Use    Vaping status: Never Used   Substance and Sexual Activity    Alcohol use: Yes     Comment: limited    Drug use: No    Sexual activity: Never   Other Topics Concern    Parent/sibling w/ CABG, MI or angioplasty before 65F  "55M? Yes     Comment: Father age 59     Social Determinants of Health     Interpersonal Safety: Low Risk  (4/9/2024)    Interpersonal Safety     Do you feel physically and emotionally safe where you currently live?: Yes     Within the past 12 months, have you been hit, slapped, kicked or otherwise physically hurt by someone?: No     Within the past 12 months, have you been humiliated or emotionally abused in other ways by your partner or ex-partner?: No       Family History -   Family History   Problem Relation Age of Onset    Hypertension Father     Diabetes Paternal Grandmother     Hypertension Paternal Grandmother     Hypertension Paternal Grandfather     Diabetes Brother        Review of Systems - As per HPI and PMHx, otherwise review of system review of the head and neck negative. Otherwise 10+ review of system is negative    Physical Exam  BP (!) 142/92   Temp 97.3  F (36.3  C) (Temporal)   Ht 1.778 m (5' 10\")   Wt (!) 192.8 kg (425 lb)   BMI 60.98 kg/m    BMI: Body mass index is 60.98 kg/m .    General - The patient is well nourished and well developed, and appears to have good nutritional status.  Alert and oriented to person and place, answers questions and cooperates with examination appropriately.    SKIN - No suspicious lesions or rashes.  Respiration - No respiratory distress.  Head and Face - Normocephalic and atraumatic, with no gross asymmetry noted of the contour of the facial features.  The facial nerve is intact, with strong symmetric movements.    Voice and Breathing - The patient was breathing comfortably without the use of accessory muscles. The patients voice was clear and strong, and had appropriate pitch and quality.    Ears - Bilateral pinna and EACs with normal appearing overlying skin. Tympanic membrane intact with good mobility on pneumatic otoscopy bilaterally. Bony landmarks of the ossicular chain are normal. The tympanic membranes are normal in appearance. No retraction, " perforation, or masses.  No fluid or purulence was seen in the external canal or the middle ear.     Eyes - Extraocular movements intact.  Sclera were not icteric or injected, conjunctiva were pink and moist.    Mouth - Examination of the oral cavity showed pink, healthy oral mucosa. No lesions or ulcerations noted.  The tongue was mobile and midline, and the dentition were in good condition.      Throat - The walls of the oropharynx were smooth, pink, moist, symmetric, and had no lesions or ulcerations.  The tonsillar pillars and soft palate were symmetric.  The uvula was midline on elevation.    Neck - Normal midline excursion of the laryngotracheal complex during swallowing.  Full range of motion on passive movement.  Palpation of the occipital, submental, submandibular, internal jugular chain, and supraclavicular nodes did not demonstrate any abnormal lymph nodes or masses.  The carotid pulse was palpable bilaterally.  Palpation of the thyroid was soft and smooth, with no nodules or goiter appreciated.  The trachea was mobile and midline.    Nose - External contour is symmetric, no gross deflection or scars.  Nasal mucosa is pink and moist with no abnormal mucus.  The septum was deviated to the left and somewhat obstructive, turbinates of normal size and position.  No polyps, masses, or purulence noted on examination.    Neuro - Nonfocal neuro exam is normal, CN 2 through 12 intact, normal gait and muscle tone.      Performed in clinic today:  Audiologic Studies - An audiogram and tympanogram were performed today as part of the evaluation and personally reviewed. The tympanogram shows Type A somewhat shallow curves on the right and Type A with robust peak curves on the left, with normal canal volumes and middle ear pressures.  The audiogram showed very mild low-frequency conductive loss on the right and normal thresholds on the left.    Word recognition score 100% bilaterally.    A/P - José Miguel Dickinson is a 38  year old male with what appears to be eustachian tube dysfunction with significant symptomatic on the right possibly contributing to very minimal conductive loss at low frequencies.  We discussed treatment options.  He is interested in a trial of Medrol Dosepak using nasal saline with aggressive eustachian tube exercises.  If he does not improve we discussed potential tube placement on the right.  He will be scheduled to see me in [place] in the next month and a half to reevaluate and decide whether placement of a tube is necessary.      Ander Coleman MD

## 2024-08-29 ENCOUNTER — OFFICE VISIT (OUTPATIENT)
Dept: AUDIOLOGY | Facility: CLINIC | Age: 39
End: 2024-08-29
Payer: COMMERCIAL

## 2024-08-29 ENCOUNTER — OFFICE VISIT (OUTPATIENT)
Dept: OTOLARYNGOLOGY | Facility: CLINIC | Age: 39
End: 2024-08-29
Payer: COMMERCIAL

## 2024-08-29 VITALS
WEIGHT: 315 LBS | HEIGHT: 70 IN | SYSTOLIC BLOOD PRESSURE: 142 MMHG | BODY MASS INDEX: 45.1 KG/M2 | DIASTOLIC BLOOD PRESSURE: 92 MMHG | TEMPERATURE: 97.3 F

## 2024-08-29 DIAGNOSIS — H90.71 MIXED CONDUCTIVE AND SENSORINEURAL HEARING LOSS OF RIGHT EAR WITH UNRESTRICTED HEARING OF LEFT EAR: Primary | ICD-10-CM

## 2024-08-29 DIAGNOSIS — H90.11 CONDUCTIVE HEARING LOSS OF RIGHT EAR WITH UNRESTRICTED HEARING OF LEFT EAR: Primary | ICD-10-CM

## 2024-08-29 DIAGNOSIS — H69.91 EUSTACHIAN TUBE DYSFUNCTION, RIGHT: ICD-10-CM

## 2024-08-29 PROCEDURE — 99243 OFF/OP CNSLTJ NEW/EST LOW 30: CPT | Performed by: OTOLARYNGOLOGY

## 2024-08-29 PROCEDURE — 92550 TYMPANOMETRY & REFLEX THRESH: CPT | Performed by: AUDIOLOGIST

## 2024-08-29 PROCEDURE — 92557 COMPREHENSIVE HEARING TEST: CPT | Performed by: AUDIOLOGIST

## 2024-08-29 ASSESSMENT — PAIN SCALES - GENERAL: PAINLEVEL: NO PAIN (0)

## 2024-08-29 NOTE — PROGRESS NOTES
AUDIOLOGY REPORT     SUMMARY: Audiology visit completed. See audiogram for results.     RECOMMENDATIONS: Follow-up with ENT    Rocio Harrington Licensed Audiologist #[id number]

## 2024-08-29 NOTE — LETTER
8/29/2024      José Miguel Dickinson  [address] [address]      Dear Colleague,    Thank you for referring your patient, José Miguel Dickinson, to the [facility]. Please see a copy of my visit note below.    ENT Consultation    José Miguel Dickinson who is a 38 year old male seen in consultation at the request of Ivelisse Heaton NP.      History of Present Illness - José Miguel Dickinson is a 38 year old male ear issues    Apparently patient had the ear infection along his right ear about 10 months ago.  Since then his ear has been feeling residual plugging sensation pressure.  Pops occasionally but then plucks right up.  He has nasal congestion And this may be couple times a week.  He may have some spring allergies even though was tested years ago and was negative at that time.  He has tried nasal steroid sprays without much relief.  Feels that his lower tones are down on the right as compared to the left.  BP Readings from Last 1 Encounters:   08/29/24 (!) 142/92       BP noted to be elevated today in office.  Patient to follow up with Primary Care provider regarding elevated blood pressure.    José Miguel IS NOT a smoker/uses chewing tobacco.      Past Medical History - No past medical history on file.    Current Medications -   Current Outpatient Medications:      azelastine (ASTELIN) 0.1 % nasal spray, Spray 1 spray into both nostrils 2 times daily, Disp: 30 mL, Rfl: 3     levocetirizine (XYZAL) 5 MG tablet, Take 1 tablet (5 mg) by mouth every evening, Disp: 90 tablet, Rfl: 3     omeprazole (PRILOSEC) 20 MG CR capsule, TAKE 1 CAPSULE (20 MG) BY MOUTH 2 TIMES DAILY (Patient taking differently: 20 mg daily.), Disp: 60 capsule, Rfl: 3    Allergies -   Allergies   Allergen Reactions     Adhesive Tape      silk tape     Amoxicillin-Pot Clavulanate Rash       Social History -   Social History     Socioeconomic History     Marital status:    Tobacco Use     Smoking status: Former      "Smokeless tobacco: Never   Vaping Use     Vaping status: Never Used   Substance and Sexual Activity     Alcohol use: Yes     Comment: limited     Drug use: No     Sexual activity: Never   Other Topics Concern     Parent/sibling w/ CABG, MI or angioplasty before 65F 55M? Yes     Comment: Father age 59     Social Determinants of Health     Interpersonal Safety: Low Risk  (4/9/2024)    Interpersonal Safety      Do you feel physically and emotionally safe where you currently live?: Yes      Within the past 12 months, have you been hit, slapped, kicked or otherwise physically hurt by someone?: No      Within the past 12 months, have you been humiliated or emotionally abused in other ways by your partner or ex-partner?: No       Family History -   Family History   Problem Relation Age of Onset     Hypertension Father      Diabetes Paternal Grandmother      Hypertension Paternal Grandmother      Hypertension Paternal Grandfather      Diabetes Brother        Review of Systems - As per HPI and PMHx, otherwise review of system review of the head and neck negative. Otherwise 10+ review of system is negative    Physical Exam  BP (!) 142/92   Temp 97.3  F (36.3  C) (Temporal)   Ht 1.778 m (5' 10\")   Wt (!) 192.8 kg (425 lb)   BMI 60.98 kg/m    BMI: Body mass index is 60.98 kg/m .    General - The patient is well nourished and well developed, and appears to have good nutritional status.  Alert and oriented to person and place, answers questions and cooperates with examination appropriately.    SKIN - No suspicious lesions or rashes.  Respiration - No respiratory distress.  Head and Face - Normocephalic and atraumatic, with no gross asymmetry noted of the contour of the facial features.  The facial nerve is intact, with strong symmetric movements.    Voice and Breathing - The patient was breathing comfortably without the use of accessory muscles. The patients voice was clear and strong, and had appropriate pitch and " quality.    Ears - Bilateral pinna and EACs with normal appearing overlying skin. Tympanic membrane intact with good mobility on pneumatic otoscopy bilaterally. Bony landmarks of the ossicular chain are normal. The tympanic membranes are normal in appearance. No retraction, perforation, or masses.  No fluid or purulence was seen in the external canal or the middle ear.     Eyes - Extraocular movements intact.  Sclera were not icteric or injected, conjunctiva were pink and moist.    Mouth - Examination of the oral cavity showed pink, healthy oral mucosa. No lesions or ulcerations noted.  The tongue was mobile and midline, and the dentition were in good condition.      Throat - The walls of the oropharynx were smooth, pink, moist, symmetric, and had no lesions or ulcerations.  The tonsillar pillars and soft palate were symmetric.  The uvula was midline on elevation.    Neck - Normal midline excursion of the laryngotracheal complex during swallowing.  Full range of motion on passive movement.  Palpation of the occipital, submental, submandibular, internal jugular chain, and supraclavicular nodes did not demonstrate any abnormal lymph nodes or masses.  The carotid pulse was palpable bilaterally.  Palpation of the thyroid was soft and smooth, with no nodules or goiter appreciated.  The trachea was mobile and midline.    Nose - External contour is symmetric, no gross deflection or scars.  Nasal mucosa is pink and moist with no abnormal mucus.  The septum was deviated to the left and somewhat obstructive, turbinates of normal size and position.  No polyps, masses, or purulence noted on examination.    Neuro - Nonfocal neuro exam is normal, CN 2 through 12 intact, normal gait and muscle tone.      Performed in clinic today:  Audiologic Studies - An audiogram and tympanogram were performed today as part of the evaluation and personally reviewed. The tympanogram shows Type A somewhat shallow curves on the right and Type A  with robust peak curves on the left, with normal canal volumes and middle ear pressures.  The audiogram showed very mild low-frequency conductive loss on the right and normal thresholds on the left.    Word recognition score 100% bilaterally.    A/P - José Miguel Dickinson is a 38 year old male with what appears to be eustachian tube dysfunction with significant symptomatic on the right possibly contributing to very minimal conductive loss at low frequencies.  We discussed treatment options.  He is interested in a trial of Medrol Dosepak using nasal saline with aggressive eustachian tube exercises.  If he does not improve we discussed potential tube placement on the right.  He will be scheduled to see me in [place] in the next month and a half to reevaluate and decide whether placement of a tube is necessary.      Ander Coleman MD       Again, thank you for allowing me to participate in the care of your patient.        Sincerely,        Ander Coleman MD, MD

## 2024-10-02 ENCOUNTER — OFFICE VISIT (OUTPATIENT)
Dept: OTOLARYNGOLOGY | Facility: OTHER | Age: 39
End: 2024-10-02
Payer: COMMERCIAL

## 2024-10-02 VITALS
SYSTOLIC BLOOD PRESSURE: 140 MMHG | TEMPERATURE: 96.2 F | DIASTOLIC BLOOD PRESSURE: 98 MMHG | WEIGHT: 315 LBS | BODY MASS INDEX: 60.98 KG/M2

## 2024-10-02 DIAGNOSIS — H69.91 EUSTACHIAN TUBE DYSFUNCTION, RIGHT: ICD-10-CM

## 2024-10-02 DIAGNOSIS — H92.01 OTALGIA, RIGHT: Primary | ICD-10-CM

## 2024-10-02 PROCEDURE — 69420 INCISION OF EARDRUM: CPT | Mod: RT | Performed by: OTOLARYNGOLOGY

## 2024-10-02 ASSESSMENT — PAIN SCALES - GENERAL: PAINLEVEL: NO PAIN (0)

## 2024-10-02 NOTE — LETTER
"10/2/2024      José Miguel Dickinson  [address] [address]      Dear Colleague,    Thank you for referring your patient, José Miguel Dickinson, to the [facility]. Please see a copy of my visit note below.    History of Present Illness - José Miguel Dickinson is a 38 year old male presenting in clinic today for a recheck on Patient presents with:  RECHECK: Conductive hearing loss of right ear with unrestricted hearing of left ear    Patient presents in regard to evaluation of pressure in the right ear.  He feels this may be slightly better but still has it.  Comes and goes.  He feels a little bit of drainage externally even though cannot seem fluid.  It does not \"pop\" and if it does not does not make much improvement.  He also endorses a little bit of Autophony even though still has vague symptoms.  No other symptoms of patulous eustachian tube noted..  No dizziness or vertigo.  No Tullio phenomenon.    Present Symptoms include: hearing loss and they are   stable .  José Miguel denies otolgia.      BP Readings from Last 1 Encounters:   10/02/24 (!) 140/98       BP noted to be elevated today in office.  Patient to follow up with Primary Care provider regarding elevated blood pressure.    José Miguel IS NOT a smoker/uses chewing tobacco.    Past Medical History - No past medical history on file.    Current Medications -   Current Outpatient Medications:      azelastine (ASTELIN) 0.1 % nasal spray, Spray 1 spray into both nostrils 2 times daily, Disp: 30 mL, Rfl: 3     levocetirizine (XYZAL) 5 MG tablet, Take 1 tablet (5 mg) by mouth every evening, Disp: 90 tablet, Rfl: 3     omeprazole (PRILOSEC) 20 MG CR capsule, TAKE 1 CAPSULE (20 MG) BY MOUTH 2 TIMES DAILY (Patient taking differently: 20 mg daily.), Disp: 60 capsule, Rfl: 3    Allergies -   Allergies   Allergen Reactions     Adhesive Tape      silk tape     Amoxicillin-Pot Clavulanate Rash       Social History -   Social History "     Socioeconomic History     Marital status:    Tobacco Use     Smoking status: Former     Smokeless tobacco: Never   Vaping Use     Vaping status: Never Used   Substance and Sexual Activity     Alcohol use: Yes     Comment: limited     Drug use: No     Sexual activity: Never   Other Topics Concern     Parent/sibling w/ CABG, MI or angioplasty before 65F 55M? Yes     Comment: Father age 59     Social Determinants of Health     Interpersonal Safety: Low Risk  (4/9/2024)    Interpersonal Safety      Do you feel physically and emotionally safe where you currently live?: Yes      Within the past 12 months, have you been hit, slapped, kicked or otherwise physically hurt by someone?: No      Within the past 12 months, have you been humiliated or emotionally abused in other ways by your partner or ex-partner?: No       Family History -   Family History   Problem Relation Age of Onset     Hypertension Father      Diabetes Paternal Grandmother      Hypertension Paternal Grandmother      Hypertension Paternal Grandfather      Diabetes Brother        Review of Systems - As per HPI and PMHx, otherwise review of system review of the head and neck negative. Otherwise 10+ review of system is negative    Physical Exam  BP (!) 140/98   Temp (!) 96.2  F (35.7  C) (Temporal)   Wt (!) 192.8 kg (425 lb)   BMI 60.98 kg/m    BMI: Body mass index is 60.98 kg/m .    General - The patient is well nourished and well developed, and appears to have good nutritional status.  Alert and oriented to person and place, answers questions and cooperates with examination appropriately.    SKIN - No suspicious lesions or rashes.  Respiration - No respiratory distress.  Head and Face - Normocephalic and atraumatic, with no gross asymmetry noted of the contour of the facial features.  The facial nerve is intact, with strong symmetric movements.    Voice and Breathing - The patient was breathing comfortably without the use of accessory muscles.  The patients voice was clear and strong, and had appropriate pitch and quality.    Ears - Bilateral pinna and EACs with normal appearing overlying skin. Tympanic membrane intact with good mobility on pneumatic otoscopy bilaterally. Bony landmarks of the ossicular chain are normal. The tympanic membranes are normal in appearance. No retraction, perforation, or masses.  No fluid or purulence was seen in the external canal or the middle ear.     Eyes - Extraocular movements intact.  Sclera were not icteric or injected, conjunctiva were pink and moist.    Neuro - Nonfocal neuro exam is normal, CN 2 through 12 intact, normal gait and muscle tone.    At this point we discussed his eustachian tube dysfunction and in the absence of fluid it could certainly be a mixed presentation with some aspects of the patulous eustachian tube.  He is interested in some intervention.  We discussed potentially creating myringotomy and see how he does.  At least if there is element of patulous tube and there worsening of some symptoms small myringotomy with close pretty quickly.  Performed in clinic today:  Right tympanotomy without tube placement.  Patient stands risks and benefits discussed wishes to go ahead with it.  I examined the ear under the microscope.  Topical phenol was used to anesthetize posterior inferior quadrant.  Small myringotomy was made with a myringotomy knife.  No fluid is found.  Patient tolerated procedure well.      A/P - José Miguel Dickinson is a 38 year old male Patient presents with:  RECHECK: Conductive hearing loss of right ear with unrestricted hearing of left ear    Patient status post myringotomy on the right without tube placement.  He actually feels ear being slightly more plugged now.  At this point we will just observe see how he does.  Will reassess in a month and decide if any further intervention should be performed.    .      At José Miguel next appointment they may not need a hearing test.      Ander  MD [employer]          Again, thank you for allowing me to participate in the care of your patient.        Sincerely,        Ander Coleman MD, MD

## 2024-10-10 ENCOUNTER — OFFICE VISIT (OUTPATIENT)
Dept: FAMILY MEDICINE | Facility: OTHER | Age: 39
End: 2024-10-10
Payer: COMMERCIAL

## 2024-10-10 VITALS
RESPIRATION RATE: 22 BRPM | HEIGHT: 69 IN | WEIGHT: 315 LBS | TEMPERATURE: 98.6 F | DIASTOLIC BLOOD PRESSURE: 84 MMHG | OXYGEN SATURATION: 95 % | HEART RATE: 63 BPM | BODY MASS INDEX: 46.65 KG/M2 | SYSTOLIC BLOOD PRESSURE: 164 MMHG

## 2024-10-10 DIAGNOSIS — I10 ESSENTIAL HYPERTENSION WITH GOAL BLOOD PRESSURE LESS THAN 140/90: ICD-10-CM

## 2024-10-10 DIAGNOSIS — R73.03 PREDIABETES: ICD-10-CM

## 2024-10-10 DIAGNOSIS — Z30.2 ENCOUNTER FOR VASECTOMY: ICD-10-CM

## 2024-10-10 DIAGNOSIS — Z13.6 CARDIOVASCULAR SCREENING; LDL GOAL LESS THAN 160: ICD-10-CM

## 2024-10-10 DIAGNOSIS — E66.01 MORBID OBESITY (H): ICD-10-CM

## 2024-10-10 DIAGNOSIS — K21.9 GASTROESOPHAGEAL REFLUX DISEASE WITHOUT ESOPHAGITIS: ICD-10-CM

## 2024-10-10 DIAGNOSIS — Z01.818 PREOP GENERAL PHYSICAL EXAM: Primary | ICD-10-CM

## 2024-10-10 LAB
ALBUMIN SERPL BCG-MCNC: 4 G/DL (ref 3.5–5.2)
ALP SERPL-CCNC: 81 U/L (ref 40–150)
ALT SERPL W P-5'-P-CCNC: 31 U/L (ref 0–70)
ANION GAP SERPL CALCULATED.3IONS-SCNC: 10 MMOL/L (ref 7–15)
AST SERPL W P-5'-P-CCNC: 23 U/L (ref 0–45)
BILIRUB SERPL-MCNC: 0.5 MG/DL
BUN SERPL-MCNC: 13.8 MG/DL (ref 6–20)
CALCIUM SERPL-MCNC: 9 MG/DL (ref 8.8–10.4)
CHLORIDE SERPL-SCNC: 103 MMOL/L (ref 98–107)
CHOLEST SERPL-MCNC: 154 MG/DL
CREAT SERPL-MCNC: 0.85 MG/DL (ref 0.67–1.17)
EGFRCR SERPLBLD CKD-EPI 2021: >90 ML/MIN/1.73M2
ERYTHROCYTE [DISTWIDTH] IN BLOOD BY AUTOMATED COUNT: 12.1 % (ref 10–15)
EST. AVERAGE GLUCOSE BLD GHB EST-MCNC: 126 MG/DL
FASTING STATUS PATIENT QL REPORTED: NO
FASTING STATUS PATIENT QL REPORTED: NO
GLUCOSE SERPL-MCNC: 105 MG/DL (ref 70–99)
HBA1C MFR BLD: 6 % (ref 0–5.6)
HCO3 SERPL-SCNC: 24 MMOL/L (ref 22–29)
HCT VFR BLD AUTO: 44.6 % (ref 40–53)
HDLC SERPL-MCNC: 22 MG/DL
HGB BLD-MCNC: 15 G/DL (ref 13.3–17.7)
LDLC SERPL CALC-MCNC: 94 MG/DL
MCH RBC QN AUTO: 29.4 PG (ref 26.5–33)
MCHC RBC AUTO-ENTMCNC: 33.6 G/DL (ref 31.5–36.5)
MCV RBC AUTO: 87 FL (ref 78–100)
NONHDLC SERPL-MCNC: 132 MG/DL
PLATELET # BLD AUTO: 223 10E3/UL (ref 150–450)
POTASSIUM SERPL-SCNC: 3.8 MMOL/L (ref 3.4–5.3)
PROT SERPL-MCNC: 7.1 G/DL (ref 6.4–8.3)
RBC # BLD AUTO: 5.11 10E6/UL (ref 4.4–5.9)
SODIUM SERPL-SCNC: 137 MMOL/L (ref 135–145)
TRIGL SERPL-MCNC: 192 MG/DL
WBC # BLD AUTO: 9.1 10E3/UL (ref 4–11)

## 2024-10-10 PROCEDURE — 36415 COLL VENOUS BLD VENIPUNCTURE: CPT | Performed by: FAMILY MEDICINE

## 2024-10-10 PROCEDURE — 93000 ELECTROCARDIOGRAM COMPLETE: CPT | Performed by: FAMILY MEDICINE

## 2024-10-10 PROCEDURE — 80053 COMPREHEN METABOLIC PANEL: CPT | Performed by: FAMILY MEDICINE

## 2024-10-10 PROCEDURE — 83036 HEMOGLOBIN GLYCOSYLATED A1C: CPT | Performed by: FAMILY MEDICINE

## 2024-10-10 PROCEDURE — 80061 LIPID PANEL: CPT | Performed by: FAMILY MEDICINE

## 2024-10-10 PROCEDURE — 85027 COMPLETE CBC AUTOMATED: CPT | Performed by: FAMILY MEDICINE

## 2024-10-10 PROCEDURE — 99214 OFFICE O/P EST MOD 30 MIN: CPT | Performed by: FAMILY MEDICINE

## 2024-10-10 PROCEDURE — G2211 COMPLEX E/M VISIT ADD ON: HCPCS | Performed by: FAMILY MEDICINE

## 2024-10-10 RX ORDER — HYDROCHLOROTHIAZIDE 25 MG/1
12.5 TABLET ORAL DAILY
Qty: 30 TABLET | Refills: 1 | Status: SHIPPED | OUTPATIENT
Start: 2024-10-10

## 2024-10-10 RX ORDER — IBUPROFEN 200 MG
200 TABLET ORAL EVERY 4 HOURS PRN
COMMUNITY

## 2024-10-10 ASSESSMENT — PAIN SCALES - GENERAL: PAINLEVEL: NO PAIN (0)

## 2024-10-10 NOTE — PROGRESS NOTES
Preoperative Evaluation  [facility]  [address]  [place] [address]  Phone: [phone]  Primary Provider: Physician No Ref-Primary  Pre-op Performing Provider: Marlon Dumont MD, MD  Oct 10, 2024             10/10/2024   Surgical Information   What procedure is being done? Vasectomy   Facility or Hospital where procedure/surgery will be performed: [facility]   Who is doing the procedure / surgery? [facility]   Date of surgery / procedure: 10/24/24   Time of surgery / procedure: 8am   Where do you plan to recover after surgery? at home with family        Fax number for surgical facility: Fax: [phone]     Assessment & Plan     The proposed surgical procedure is considered LOW risk.      ICD-10-CM    1. Preop general physical exam  Z01.818       2. Encounter for vasectomy  Z30.2 EKG 12-lead complete w/read - Clinics      3. Essential hypertension with goal blood pressure less than 140/90  I10 CBC with platelets     Comprehensive metabolic panel (BMP + Alb, Alk Phos, ALT, AST, Total. Bili, TP)     EKG 12-lead complete w/read - Clinics     hydroCHLOROthiazide (HYDRODIURIL) 25 MG tablet     CBC with platelets     Comprehensive metabolic panel (BMP + Alb, Alk Phos, ALT, AST, Total. Bili, TP)      4. Morbid obesity (H)  E66.01 CBC with platelets     Comprehensive metabolic panel (BMP + Alb, Alk Phos, ALT, AST, Total. Bili, TP)     Hemoglobin A1c     CBC with platelets     Comprehensive metabolic panel (BMP + Alb, Alk Phos, ALT, AST, Total. Bili, TP)     Hemoglobin A1c      5. Prediabetes  R73.03 Comprehensive metabolic panel (BMP + Alb, Alk Phos, ALT, AST, Total. Bili, TP)     Hemoglobin A1c     Comprehensive metabolic panel (BMP + Alb, Alk Phos, ALT, AST, Total. Bili, TP)     Hemoglobin A1c      6. Gastroesophageal reflux disease without esophagitis  K21.9       7. CARDIOVASCULAR SCREENING; LDL GOAL LESS THAN 160  Z13.6 Lipid panel reflex to direct LDL Fasting      Lipid panel reflex to direct LDL Fasting          Obtained some lab work today since patient has not had any labs for 2 years and has known prediabetes as well as uncontrolled hypertension.  Strongly encourage patient to get his blood pressure under tighter control.  Will start with hydrochlorothiazide since he reports no response to previous losartan and had lightheadedness on lisinopril.  Follow-up blood pressure in a few weeks.        Possible Sleep Apnea: watch for desaturations       10/10/2024     3:38 PM   STOP-Bang Total Score   Total Score 5   Risk Stratification 5 - 8: High Risk for LORA          Risks and Recommendations  The patient has the following additional risks and recommendations for perioperative complications:   - Morbid obesity (BMI >40)        Preoperative Medication Instructions  Antiplatelet or Anticoagulation Medication Instructions   - Patient is on no antiplatelet or anticoagulation medications.    Additional Medication Instructions  Take all scheduled medications on the day of surgery EXCEPT for modifications listed below:   - ibuprofen (Advil, Motrin): DO NOT TAKE 1 day before surgery.     Recommendation  Approval given to proceed with proposed procedure, without further diagnostic evaluation.    James Spann is a 38 year old, presenting for the following:  Pre-Op Exam          10/10/2024     2:56 PM   Additional Questions   Roomed by Francisca   Accompanied by Self         10/10/2024     2:56 PM   Patient Reported Additional Medications   Patient reports taking the following new medications CBD gummies     HPI related to upcoming procedure: Pt is here because he would like to have a vasectomy.  He is confident that he doesn't want any more children.  Due to his BMI and vas-related anatomy, they want to do this in the OR under anesthesia.          10/10/2024   Pre-Op Questionnaire   Have you ever had a heart attack or stroke? No   Have you ever had surgery on your heart or blood vessels,  such as a stent placement, a coronary artery bypass, or surgery on an artery in your head, neck, heart, or legs? No   Do you have chest pain with activity? No   Do you have a history of heart failure? No   Do you currently have a cold, bronchitis or symptoms of other infection? No   Do you have a cough, shortness of breath, or wheezing? No   Do you or anyone in your family have previous history of blood clots? No   Do you or does anyone in your family have a serious bleeding problem such as prolonged bleeding following surgeries or cuts? No   Have you ever had problems with anemia or been told to take iron pills? No   Have you had any abnormal blood loss such as black, tarry or bloody stools? No   Have you ever had a blood transfusion? (!) UNKNOWN, had a serious injury as a teenager, not sure if he required a transfusion at that time.   Are you willing to have a blood transfusion if it is medically needed before, during, or after your surgery? Yes   Have you or any of your relatives ever had problems with anesthesia? No   Do you have sleep apnea, excessive snoring or daytime drowsiness? (!) UNKNOWN   Do you have any artifical heart valves or other implanted medical devices like a pacemaker, defibrillator, or continuous glucose monitor? No   Do you have artificial joints? No   Are you allergic to latex? No        Health Care Directive  Patient does not have a Health Care Directive or Living Will: Discussed advance care planning with patient; information given to patient to review.    Preoperative Review of    reviewed - controlled substances reflected in medication list.      Status of Chronic Conditions:  See problem list for active medical problems.  Problems all longstanding and stable, except as noted/documented.  See ROS for pertinent symptoms related to these conditions.    HYPERTENSION - Patient has longstanding history of HTN , currently denies any symptoms referable to elevated blood pressure.  Specifically denies chest pain, palpitations, dyspnea, orthopnea, PND or peripheral edema. Blood pressure readings have not been in normal range. Current medication regimen is as listed below. Patient denies any side effects of medication.     Patient Active Problem List    Diagnosis Date Noted    Essential hypertension with goal blood pressure less than 140/90 11/08/2017     Priority: Medium    Esophageal reflux 03/31/2016     Priority: Medium    CARDIOVASCULAR SCREENING; LDL GOAL LESS THAN 160 10/31/2010     Priority: Medium    Morbid obesity (H) 08/20/2005     Priority: Medium    Ingrowing nail 08/20/2005     Priority: Medium      Past Medical History:   Diagnosis Date    Hypertension 1985     Past Surgical History:   Procedure Laterality Date    EYE SURGERY  Nov 2017    HEAD & NECK SURGERY       Current Outpatient Medications   Medication Sig Dispense Refill    azelastine (ASTELIN) 0.1 % nasal spray Spray 1 spray into both nostrils 2 times daily 30 mL 3    ibuprofen (ADVIL/MOTRIN) 200 MG tablet Take 200 mg by mouth every 4 hours as needed for pain.      levocetirizine (XYZAL) 5 MG tablet Take 1 tablet (5 mg) by mouth every evening 90 tablet 3    omeprazole (PRILOSEC) 20 MG CR capsule TAKE 1 CAPSULE (20 MG) BY MOUTH 2 TIMES DAILY (Patient taking differently: 20 mg daily.) 60 capsule 3       Allergies   Allergen Reactions    Adhesive Tape      silk tape    Amoxicillin-Pot Clavulanate Rash        Social History     Tobacco Use    Smoking status: Some Days     Types: Cigars     Passive exposure: Yes    Smokeless tobacco: Never    Tobacco comments:     Only uave a cigar twice a year   Substance Use Topics    Alcohol use: Yes     Comment: 1 drink every couple weeks       Family History   Problem Relation Age of Onset    Hypertension Father     Hyperlipidemia Father     Obesity Father     Diabetes Paternal Grandmother     Hypertension Paternal Grandmother     Obesity Paternal Grandmother     Hypertension Paternal  "Grandfather     Other Cancer Paternal Grandfather     Obesity Paternal Grandfather     Diabetes Brother     Other Cancer Maternal Grandfather     Breast Cancer Maternal Grandmother      History   Drug Use No               Objective    BP (!) 154/88   Pulse 63   Temp 98.6  F (37  C) (Temporal)   Resp 22   Ht 1.76 m (5' 9.29\")   Wt (!) 189.6 kg (418 lb)   SpO2 95%   BMI 61.21 kg/m     Estimated body mass index is 61.21 kg/m  as calculated from the following:    Height as of this encounter: 1.76 m (5' 9.29\").    Weight as of this encounter: 189.6 kg (418 lb).  Physical Exam  GENERAL: alert and no distress  EYES: Eyes grossly normal to inspection, PERRL and conjunctivae and sclerae normal  HENT: ear canals and TM's normal, nose and mouth without ulcers or lesions  NECK: no adenopathy, no asymmetry, masses, or scars  RESP: lungs clear to auscultation - no rales, rhonchi or wheezes  CV: regular rate and rhythm, normal S1 S2, no S3 or S4, no murmur, click or rub, no peripheral edema  ABDOMEN: soft, nontender, no hepatosplenomegaly, no masses and bowel sounds normal  MS: 1-2+ pitting edema to mid-calf  SKIN: no suspicious lesions or rashes  NEURO: Normal strength and tone, mentation intact and speech normal  PSYCH: mentation appears normal, affect normal/bright    No results for input(s): \"HGB\", \"PLT\", \"INR\", \"NA\", \"POTASSIUM\", \"CR\", \"A1C\" in the last 8760 hours.     Diagnostics  No results found for this or any previous visit (from the past 720 hour(s)).   EKG: appears normal, NSR, normal axis, normal intervals, no acute ST/T changes c/w ischemia, no LVH by voltage criteria, there are no prior tracings available    Revised Cardiac Risk Index (RCRI)  The patient has the following serious cardiovascular risks for perioperative complications:   - No serious cardiac risks = 0 points     RCRI Interpretation: 0 points: Class I (very low risk - 0.4% complication rate)         Signed Electronically by: Marlon Dumont, " MD AMA  A copy of this evaluation report is provided to the requesting physician.

## 2024-10-10 NOTE — PATIENT INSTRUCTIONS
I would recommend starting hydrochlorothiazide to help with high blood pressure and swelling.  Goal is to get your BP under 140/90.    Keep working on lifestyle changes.      Consider updating your immunizations.      Your blood pressure was high today.  Please come back in 1-4 weeks for a nurse visit blood pressure check.     We will let you know your results as soon as we can.  If you have MyChart, you will be able to see your lab and imaging results shortly after they become available.  I will review these and let you know my interpretation, but this usually takes additional time.  If you have multiple labs, I usually wait until they are all back before sending a note about them unless something is worrisome.  If you do not have MyChart, we will let you know your lab results as soon as we can.  If they are normal, this can take up to a week.     Contact us or return if questions or concerns.    Have a nice day!    Dr. Dumont     How to Take Your Medication Before Surgery  Preoperative Medication Instructions   Antiplatelet or Anticoagulation Medication Instructions   - Patient is on no antiplatelet or anticoagulation medications.    Additional Medication Instructions  Take all scheduled medications on the day of surgery EXCEPT for modifications listed below:   - ibuprofen (Advil, Motrin): DO NOT TAKE 1 day before surgery.        Patient Education   Preparing for Your Surgery  For Adults  Getting started  In most cases, a nurse will call to review your health history and instructions. They will give you an arrival time based on your scheduled surgery time. Please be ready to share:  Your doctor's clinic name and phone number  Your medical, surgical, and anesthesia history  A list of allergies and sensitivities  A list of medicines, including herbal treatments and over-the-counter drugs  Whether the patient has a legal guardian (ask how to send us the papers in advance)  Note: You may not receive a call if you were  seen at our PAC (Preoperative Assessment Center).  Please tell us if you're pregnant--or if there's any chance you might be pregnant. Some surgeries may injure a fetus (unborn baby), so they require a pregnancy test. Surgeries that are safe for a fetus don't always need a test, and you can choose whether to have one.   Preparing for surgery  Within 10 to 30 days of surgery: Have a pre-op exam (sometimes called an H&P, or History and Physical). This can be done at a clinic or pre-operative center.  If you're having a , you may not need this exam. Talk to your care team.  At your pre-op exam, talk to your care team about all medicines you take. (This includes CBD oil and any drugs, such as THC, marijuana, and other forms of cannabis.) If you need to stop any medicine before surgery, ask when to start taking it again.  This is for your safety. Many medicines and drugs can make you bleed too much during surgery. Some change how well surgery (anesthesia) drugs work.  Call your insurance company to let them know you're having surgery. (If you don't have insurance, call [phone].)  Call your clinic if there's any change in your health. This includes a scrape or scratch near the surgery site, or any signs of a cold (sore throat, runny nose, cough, rash, fever).  Eating and drinking guidelines  For your safety: Unless your surgeon tells you otherwise, follow the guidelines below.  Eat and drink as normal until 8 hours before you arrive for surgery. After that, no food or milk. You can spit out gum when you arrive.  Drink clear liquids until 2 hours before you arrive. These are liquids you can see through, like water, Gatorade, and Propel Water. They also include plain black coffee and tea (no cream or milk).  No alcohol for 24 hours before you arrive. The night before surgery, stop any drinks that contain THC.  If your care team tells you to take medicine on the morning of surgery, it's okay to take it with a  sip of water. No other medicines or drugs are allowed (including CBD oil)--follow your care team's instructions.  If you have questions the day of surgery, call your hospital or surgery center.   Preventing infection  Shower or bathe the night before and the morning of surgery. Follow the instructions your clinic gave you. (If no instructions, use regular soap.)  Don't shave or clip hair near your surgery site. We'll remove the hair if needed.  Don't smoke or vape the morning of surgery. No chewing tobacco for 6 hours before you arrive. A nicotine patch is okay. You may spit out nicotine gum when you arrive.  For some surgeries, the surgeon will tell you to fully quit smoking and nicotine.  We will make every effort to keep you safe from infection. We will:  Clean our hands often with soap and water (or an alcohol-based hand rub).  Clean the skin at your surgery site with a special soap that kills germs.  Give you a special gown to keep you warm. (Cold raises the risk of infection.)  Wear hair covers, masks, gowns, and gloves during surgery.  Give antibiotic medicine, if prescribed. Not all surgeries need this medicine.  What to bring on the day of surgery  Photo ID and insurance card  Copy of your health care directive, if you have one  Glasses and hearing aids (bring cases)  You can't wear contacts during surgery  Inhaler and eye drops, if you use them (tell us about these when you arrive)  CPAP machine or breathing device, if you use them  A few personal items, if spending the night  If you have . . .  A pacemaker, ICD (cardiac defibrillator), or other implant: Bring the ID card.  An implanted stimulator: Bring the remote control.  A legal guardian: Bring a copy of the certified (court-stamped) guardianship papers.  Please remove any jewelry, including body piercings. Leave jewelry and other valuables at home.  If you're going home the day of surgery  You must have a responsible adult drive you home. They should  stay with you overnight as well.  If you don't have someone to stay with you, and you aren't safe to go home alone, we may keep you overnight. Insurance often won't pay for this.  After surgery  If it's hard to control your pain or you need more pain medicine, please call your surgeon's office.  Questions?   If you have any questions for your care team, list them here:   ____________________________________________________________________________________________________________________________________________________________________________________________________________________________________________________________  For informational purposes only. Not to replace the advice of your health care provider. Copyright   2003, 2019 [facility]. All rights reserved. Clinically reviewed by Addy Negrete MD. [employer]ART[employer] [id number] - REV 08/24.

## 2024-10-11 NOTE — RESULT ENCOUNTER NOTE
"José Miguel,    Your labs do show prediabetes, but this has not worsened from where you were a couple of years ago.  I would encourage you to work on lifestyle changes to help with this.  Continue to work on weight loss, staying active, and reducing carbohydrates in your diet.    Your \"good\" HDL cholesterol is fairly low.  Aerobic exercise such as running, jogging, walking, biking, swimming, etc. can help to increase this.  Your elevated triglycerides is probably related to the prediabetes.  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    Have a nice day!    Dr. Dumont      "

## 2024-10-16 ENCOUNTER — TELEPHONE (OUTPATIENT)
Dept: FAMILY MEDICINE | Facility: OTHER | Age: 39
End: 2024-10-16
Payer: COMMERCIAL

## 2024-10-16 NOTE — TELEPHONE ENCOUNTER
"Epic indicates that pt hasn't read his recent results.  Please ensure pt received information below.  OK to mail to pt.    José Miguel,     Your labs do show prediabetes, but this has not worsened from where you were a couple of years ago.  I would encourage you to work on lifestyle changes to help with this.  Continue to work on weight loss, staying active, and reducing carbohydrates in your diet.     Your \"good\" HDL cholesterol is fairly low.  Aerobic exercise such as running, jogging, walking, biking, swimming, etc. can help to increase this.  Your elevated triglycerides is probably related to the prediabetes.  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     Have a nice day!     Dr. Dumont  "

## 2024-10-24 NOTE — PROGRESS NOTES
"History of Present Illness - José Miguel Dickinson is a 38 year old male presenting in clinic today for a recheck on Patient presents with:  Follow Up: myringotomy    Patient presents with nonspecific right eustachian tube dysfunction symptoms mostly pressure little bit of \"popping\".  Initially had myringotomy performed as a test which helped immediately but then the next day he started feeling a bit of popping and pressure again.  Overall he feels  less muffled hearing on the right side than previously with some \"popping but he did not have before\".  However symptoms still have not completely resolved.    Present Symptoms include: otolgia and they are   stable .  José Miguel denies hearing loss.      BP Readings from Last 1 Encounters:   11/07/24 (!) 142/88       BP noted to be elevated today in office.  Patient to follow up with Primary Care provider regarding elevated blood pressure.    José Miguel IS NOT a smoker/uses chewing tobacco.    Past Medical History -   Past Medical History:   Diagnosis Date    Hypertension 1985       Current Medications -   Current Outpatient Medications:     azelastine (ASTELIN) 0.1 % nasal spray, Spray 1 spray into both nostrils 2 times daily, Disp: 30 mL, Rfl: 3    hydroCHLOROthiazide (HYDRODIURIL) 25 MG tablet, Take 0.5 tablets (12.5 mg) by mouth daily., Disp: 30 tablet, Rfl: 1    ibuprofen (ADVIL/MOTRIN) 200 MG tablet, Take 200 mg by mouth every 4 hours as needed for pain., Disp: , Rfl:     levocetirizine (XYZAL) 5 MG tablet, Take 1 tablet (5 mg) by mouth every evening, Disp: 90 tablet, Rfl: 3    omeprazole (PRILOSEC) 20 MG CR capsule, TAKE 1 CAPSULE (20 MG) BY MOUTH 2 TIMES DAILY (Patient taking differently: 20 mg daily.), Disp: 60 capsule, Rfl: 3    Allergies -   Allergies   Allergen Reactions    Adhesive Tape      silk tape    Amoxicillin-Pot Clavulanate Rash       Social History -   Social History     Socioeconomic History    Marital status:    Tobacco Use    Smoking status: " Some Days     Types: Cigars     Passive exposure: Yes    Smokeless tobacco: Never    Tobacco comments:     Only uave a cigar twice a year   Vaping Use    Vaping status: Never Used   Substance and Sexual Activity    Alcohol use: Yes     Comment: 1 drink every couple weeks    Drug use: No    Sexual activity: Yes     Partners: Female     Birth control/protection: Female Surgical   Other Topics Concern    Parent/sibling w/ CABG, MI or angioplasty before 65F 55M? Yes     Comment: Dad     Social Drivers of Health     Interpersonal Safety: Low Risk  (10/10/2024)    Interpersonal Safety     Do you feel physically and emotionally safe where you currently live?: Yes     Within the past 12 months, have you been hit, slapped, kicked or otherwise physically hurt by someone?: No     Within the past 12 months, have you been humiliated or emotionally abused in other ways by your partner or ex-partner?: No       Family History -   Family History   Problem Relation Age of Onset    Hypertension Father     Hyperlipidemia Father     Obesity Father     Diabetes Paternal Grandmother     Hypertension Paternal Grandmother     Obesity Paternal Grandmother     Hypertension Paternal Grandfather     Other Cancer Paternal Grandfather     Obesity Paternal Grandfather     Diabetes Brother     Other Cancer Maternal Grandfather     Breast Cancer Maternal Grandmother        Review of Systems - As per HPI and PMHx, otherwise review of system review of the head and neck negative. Otherwise 10+ review of system is negative    Physical Exam  BP (!) 142/88   Temp 97.8  F (36.6  C) (Temporal)   Wt (!) 189.6 kg (418 lb)   BMI 61.21 kg/m    BMI: Body mass index is 61.21 kg/m .    General - The patient is well nourished and well developed, and appears to have good nutritional status.  Alert and oriented to person and place, answers questions and cooperates with examination appropriately.    SKIN - No suspicious lesions or rashes.  Respiration - No  respiratory distress.  Head and Face - Normocephalic and atraumatic, with no gross asymmetry noted of the contour of the facial features.  The facial nerve is intact, with strong symmetric movements.    Voice and Breathing - The patient was breathing comfortably without the use of accessory muscles. The patients voice was clear and strong, and had appropriate pitch and quality.    Ears - Bilateral pinna and EACs with normal appearing overlying skin. Tympanic membrane intact with good mobility on pneumatic otoscopy bilaterally. Bony landmarks of the ossicular chain are normal. The tympanic membranes are normal in appearance. No retraction, perforation, or masses.  No fluid or purulence was seen in the external canal or the middle ear.     Eyes - Extraocular movements intact.  Sclera were not icteric or injected, conjunctiva were pink and moist.    Mouth - Examination of the oral cavity showed pink, healthy oral mucosa. No lesions or ulcerations noted.  The tongue was mobile and midline, and the dentition were in good condition.      Throat - The walls of the oropharynx were smooth, pink, moist, symmetric, and had no lesions or ulcerations.  The tonsillar pillars and soft palate were symmetric. . The uvula was midline on elevation.    Neck - Normal midline excursion of the laryngotracheal complex during swallowing.  Full range of motion on passive movement.  Palpation of the occipital, submental, submandibular, internal jugular chain, and supraclavicular nodes did not demonstrate any abnormal lymph nodes or masses.  The carotid pulse was palpable bilaterally.  Palpation of the thyroid was soft and smooth, with no nodules or goiter appreciated.  The trachea was mobile and midline.    Nose - External contour is symmetric, no gross deflection or scars.  Nasal mucosa is pink and moist with no abnormal mucus.  The septum was midline and non-obstructive, turbinates of normal size and position.  No polyps, masses, or  purulence noted on examination.    Neuro - Nonfocal neuro exam is normal, CN 2 through 12 intact, normal gait and muscle tone.      Performed in clinic today:  No procedures preformed in clinic today      A/P - José Miguel VEGA Teena is a 38 year old male Patient presents with:  Follow Up: myringotomy    Patient with a persistent eustachian tube dysfunction symptoms on the right.  Slight improvement noted.  At this point we discussed potentially continue eustachian tube exercises and then follow-up in a few months if the symptoms persist.    José Miguel should follow up as needed.            Ander Coleman MD

## 2024-11-07 ENCOUNTER — OFFICE VISIT (OUTPATIENT)
Dept: OTOLARYNGOLOGY | Facility: CLINIC | Age: 39
End: 2024-11-07
Payer: COMMERCIAL

## 2024-11-07 VITALS
TEMPERATURE: 97.8 F | SYSTOLIC BLOOD PRESSURE: 142 MMHG | WEIGHT: 315 LBS | BODY MASS INDEX: 61.21 KG/M2 | DIASTOLIC BLOOD PRESSURE: 88 MMHG

## 2024-11-07 DIAGNOSIS — H69.91 EUSTACHIAN TUBE DYSFUNCTION, RIGHT: Primary | ICD-10-CM

## 2024-11-07 PROCEDURE — 99213 OFFICE O/P EST LOW 20 MIN: CPT | Performed by: OTOLARYNGOLOGY

## 2024-11-07 ASSESSMENT — PAIN SCALES - GENERAL: PAINLEVEL_OUTOF10: NO PAIN (0)

## 2024-11-07 NOTE — LETTER
"11/7/2024      José Miguel Dickinson  [address] [address]      Dear Colleague,    Thank you for referring your patient, José Miguel Dickinson, to the [facility]. Please see a copy of my visit note below.    History of Present Illness - José Miguel Dickinson is a 38 year old male presenting in clinic today for a recheck on Patient presents with:  Follow Up: myringotomy    Patient presents with nonspecific right eustachian tube dysfunction symptoms mostly pressure little bit of \"popping\".  Initially had myringotomy performed as a test which helped immediately but then the next day he started feeling a bit of popping and pressure again.  Overall he feels  less muffled hearing on the right side than previously with some \"popping but he did not have before\".  However symptoms still have not completely resolved.    Present Symptoms include: otolgia and they are   stable .  José Miguel denies hearing loss.      BP Readings from Last 1 Encounters:   11/07/24 (!) 142/88       BP noted to be elevated today in office.  Patient to follow up with Primary Care provider regarding elevated blood pressure.    José Miguel IS NOT a smoker/uses chewing tobacco.    Past Medical History -   Past Medical History:   Diagnosis Date     Hypertension 1985       Current Medications -   Current Outpatient Medications:      azelastine (ASTELIN) 0.1 % nasal spray, Spray 1 spray into both nostrils 2 times daily, Disp: 30 mL, Rfl: 3     hydroCHLOROthiazide (HYDRODIURIL) 25 MG tablet, Take 0.5 tablets (12.5 mg) by mouth daily., Disp: 30 tablet, Rfl: 1     ibuprofen (ADVIL/MOTRIN) 200 MG tablet, Take 200 mg by mouth every 4 hours as needed for pain., Disp: , Rfl:      levocetirizine (XYZAL) 5 MG tablet, Take 1 tablet (5 mg) by mouth every evening, Disp: 90 tablet, Rfl: 3     omeprazole (PRILOSEC) 20 MG CR capsule, TAKE 1 CAPSULE (20 MG) BY MOUTH 2 TIMES DAILY (Patient taking differently: 20 mg daily.), Disp: 60 " capsule, Rfl: 3    Allergies -   Allergies   Allergen Reactions     Adhesive Tape      silk tape     Amoxicillin-Pot Clavulanate Rash       Social History -   Social History     Socioeconomic History     Marital status:    Tobacco Use     Smoking status: Some Days     Types: Cigars     Passive exposure: Yes     Smokeless tobacco: Never     Tobacco comments:     Only uave a cigar twice a year   Vaping Use     Vaping status: Never Used   Substance and Sexual Activity     Alcohol use: Yes     Comment: 1 drink every couple weeks     Drug use: No     Sexual activity: Yes     Partners: Female     Birth control/protection: Female Surgical   Other Topics Concern     Parent/sibling w/ CABG, MI or angioplasty before 65F 55M? Yes     Comment: Dad     Social Drivers of Health     Interpersonal Safety: Low Risk  (10/10/2024)    Interpersonal Safety      Do you feel physically and emotionally safe where you currently live?: Yes      Within the past 12 months, have you been hit, slapped, kicked or otherwise physically hurt by someone?: No      Within the past 12 months, have you been humiliated or emotionally abused in other ways by your partner or ex-partner?: No       Family History -   Family History   Problem Relation Age of Onset     Hypertension Father      Hyperlipidemia Father      Obesity Father      Diabetes Paternal Grandmother      Hypertension Paternal Grandmother      Obesity Paternal Grandmother      Hypertension Paternal Grandfather      Other Cancer Paternal Grandfather      Obesity Paternal Grandfather      Diabetes Brother      Other Cancer Maternal Grandfather      Breast Cancer Maternal Grandmother        Review of Systems - As per HPI and PMHx, otherwise review of system review of the head and neck negative. Otherwise 10+ review of system is negative    Physical Exam  BP (!) 142/88   Temp 97.8  F (36.6  C) (Temporal)   Wt (!) 189.6 kg (418 lb)   BMI 61.21 kg/m    BMI: Body mass index is 61.21  kg/m .    General - The patient is well nourished and well developed, and appears to have good nutritional status.  Alert and oriented to person and place, answers questions and cooperates with examination appropriately.    SKIN - No suspicious lesions or rashes.  Respiration - No respiratory distress.  Head and Face - Normocephalic and atraumatic, with no gross asymmetry noted of the contour of the facial features.  The facial nerve is intact, with strong symmetric movements.    Voice and Breathing - The patient was breathing comfortably without the use of accessory muscles. The patients voice was clear and strong, and had appropriate pitch and quality.    Ears - Bilateral pinna and EACs with normal appearing overlying skin. Tympanic membrane intact with good mobility on pneumatic otoscopy bilaterally. Bony landmarks of the ossicular chain are normal. The tympanic membranes are normal in appearance. No retraction, perforation, or masses.  No fluid or purulence was seen in the external canal or the middle ear.     Eyes - Extraocular movements intact.  Sclera were not icteric or injected, conjunctiva were pink and moist.    Mouth - Examination of the oral cavity showed pink, healthy oral mucosa. No lesions or ulcerations noted.  The tongue was mobile and midline, and the dentition were in good condition.      Throat - The walls of the oropharynx were smooth, pink, moist, symmetric, and had no lesions or ulcerations.  The tonsillar pillars and soft palate were symmetric. . The uvula was midline on elevation.    Neck - Normal midline excursion of the laryngotracheal complex during swallowing.  Full range of motion on passive movement.  Palpation of the occipital, submental, submandibular, internal jugular chain, and supraclavicular nodes did not demonstrate any abnormal lymph nodes or masses.  The carotid pulse was palpable bilaterally.  Palpation of the thyroid was soft and smooth, with no nodules or goiter  appreciated.  The trachea was mobile and midline.    Nose - External contour is symmetric, no gross deflection or scars.  Nasal mucosa is pink and moist with no abnormal mucus.  The septum was midline and non-obstructive, turbinates of normal size and position.  No polyps, masses, or purulence noted on examination.    Neuro - Nonfocal neuro exam is normal, CN 2 through 12 intact, normal gait and muscle tone.      Performed in clinic today:  No procedures preformed in clinic today      A/P - José Miguel GARY Dickinson is a 38 year old male Patient presents with:  Follow Up: myringotomy    Patient with a persistent eustachian tube dysfunction symptoms on the right.  Slight improvement noted.  At this point we discussed potentially continue eustachian tube exercises and then follow-up in a few months if the symptoms persist.    José Miguel should follow up as needed.            Ander Coleman MD           Again, thank you for allowing me to participate in the care of your patient.        Sincerely,        Ander Coleman MD, MD

## 2024-12-11 ENCOUNTER — OFFICE VISIT (OUTPATIENT)
Dept: FAMILY MEDICINE | Facility: CLINIC | Age: 39
End: 2024-12-11
Payer: COMMERCIAL

## 2024-12-11 VITALS
SYSTOLIC BLOOD PRESSURE: 138 MMHG | OXYGEN SATURATION: 98 % | DIASTOLIC BLOOD PRESSURE: 78 MMHG | HEIGHT: 70 IN | RESPIRATION RATE: 22 BRPM | WEIGHT: 315 LBS | HEART RATE: 69 BPM | BODY MASS INDEX: 45.1 KG/M2 | TEMPERATURE: 97.7 F

## 2024-12-11 DIAGNOSIS — K21.9 GASTROESOPHAGEAL REFLUX DISEASE WITHOUT ESOPHAGITIS: ICD-10-CM

## 2024-12-11 DIAGNOSIS — Z00.00 ROUTINE GENERAL MEDICAL EXAMINATION AT A HEALTH CARE FACILITY: Primary | ICD-10-CM

## 2024-12-11 DIAGNOSIS — H69.93 DYSFUNCTION OF BOTH EUSTACHIAN TUBES: ICD-10-CM

## 2024-12-11 DIAGNOSIS — R73.03 PREDIABETES: ICD-10-CM

## 2024-12-11 DIAGNOSIS — E66.01 MORBID OBESITY (H): ICD-10-CM

## 2024-12-11 DIAGNOSIS — R06.83 SNORING: ICD-10-CM

## 2024-12-11 DIAGNOSIS — I10 ESSENTIAL HYPERTENSION WITH GOAL BLOOD PRESSURE LESS THAN 140/90: ICD-10-CM

## 2024-12-11 PROCEDURE — 90715 TDAP VACCINE 7 YRS/> IM: CPT | Performed by: STUDENT IN AN ORGANIZED HEALTH CARE EDUCATION/TRAINING PROGRAM

## 2024-12-11 PROCEDURE — 90471 IMMUNIZATION ADMIN: CPT | Performed by: STUDENT IN AN ORGANIZED HEALTH CARE EDUCATION/TRAINING PROGRAM

## 2024-12-11 PROCEDURE — 99213 OFFICE O/P EST LOW 20 MIN: CPT | Mod: 25 | Performed by: STUDENT IN AN ORGANIZED HEALTH CARE EDUCATION/TRAINING PROGRAM

## 2024-12-11 PROCEDURE — 99395 PREV VISIT EST AGE 18-39: CPT | Mod: 25 | Performed by: STUDENT IN AN ORGANIZED HEALTH CARE EDUCATION/TRAINING PROGRAM

## 2024-12-11 RX ORDER — HYDROCHLOROTHIAZIDE 25 MG/1
12.5 TABLET ORAL DAILY
Qty: 90 TABLET | Refills: 3 | Status: SHIPPED | OUTPATIENT
Start: 2024-12-11

## 2024-12-11 RX ORDER — AZELASTINE 1 MG/ML
1 SPRAY, METERED NASAL 2 TIMES DAILY
Qty: 30 ML | Refills: 3 | Status: SHIPPED | OUTPATIENT
Start: 2024-12-11

## 2024-12-11 SDOH — HEALTH STABILITY: PHYSICAL HEALTH: ON AVERAGE, HOW MANY DAYS PER WEEK DO YOU ENGAGE IN MODERATE TO STRENUOUS EXERCISE (LIKE A BRISK WALK)?: 0 DAYS

## 2024-12-11 SDOH — HEALTH STABILITY: PHYSICAL HEALTH: ON AVERAGE, HOW MANY MINUTES DO YOU ENGAGE IN EXERCISE AT THIS LEVEL?: 0 MIN

## 2024-12-11 ASSESSMENT — SOCIAL DETERMINANTS OF HEALTH (SDOH): HOW OFTEN DO YOU GET TOGETHER WITH FRIENDS OR RELATIVES?: MORE THAN THREE TIMES A WEEK

## 2024-12-11 ASSESSMENT — PAIN SCALES - GENERAL: PAINLEVEL_OUTOF10: NO PAIN (0)

## 2024-12-11 NOTE — PATIENT INSTRUCTIONS
Patient Education   Preventive Care Advice   This is general advice given by our system to help you stay healthy. However, your care team may have specific advice just for you. Please talk to your care team about your preventive care needs.  Nutrition  Eat 5 or more servings of fruits and vegetables each day.  Try wheat bread, brown rice and whole grain pasta (instead of white bread, rice, and pasta).  Get enough calcium and vitamin D. Check the label on foods and aim for 100% of the RDA (recommended daily allowance).  Lifestyle  Exercise at least 150 minutes each week  (30 minutes a day, 5 days a week).  Do muscle strengthening activities 2 days a week. These help control your weight and prevent disease.  No smoking.  Wear sunscreen to prevent skin cancer.  Have a dental exam and cleaning every 6 months.  Yearly exams  See your health care team every year to talk about:  Any changes in your health.  Any medicines your care team has prescribed.  Preventive care, family planning, and ways to prevent chronic diseases.  Shots (vaccines)   HPV shots (up to age 26), if you've never had them before.  Hepatitis B shots (up to age 59), if you've never had them before.  COVID-19 shot: Get this shot when it's due.  Flu shot: Get a flu shot every year.  Tetanus shot: Get a tetanus shot every 10 years.  Pneumococcal, hepatitis A, and RSV shots: Ask your care team if you need these based on your risk.  Shingles shot (for age 50 and up)  General health tests  Diabetes screening:  Starting at age 35, Get screened for diabetes at least every 3 years.  If you are younger than age 35, ask your care team if you should be screened for diabetes.  Cholesterol test: At age 39, start having a cholesterol test every 5 years, or more often if advised.  Bone density scan (DEXA): At age 50, ask your care team if you should have this scan for osteoporosis (brittle bones).  Hepatitis C: Get tested at least once in your life.  STIs (sexually  transmitted infections)  Before age 24: Ask your care team if you should be screened for STIs.  After age 24: Get screened for STIs if you're at risk. You are at risk for STIs (including HIV) if:  You are sexually active with more than one person.  You don't use condoms every time.  You or a partner was diagnosed with a sexually transmitted infection.  If you are at risk for HIV, ask about PrEP medicine to prevent HIV.  Get tested for HIV at least once in your life, whether you are at risk for HIV or not.  Cancer screening tests  Cervical cancer screening: If you have a cervix, begin getting regular cervical cancer screening tests starting at age 21.  Breast cancer scan (mammogram): If you've ever had breasts, begin having regular mammograms starting at age 40. This is a scan to check for breast cancer.  Colon cancer screening: It is important to start screening for colon cancer at age 45.  Have a colonoscopy test every 10 years (or more often if you're at risk) Or, ask your provider about stool tests like a FIT test every year or Cologuard test every 3 years.  To learn more about your testing options, visit:   .  For help making a decision, visit:   https://bit.ly/sa35792.  Prostate cancer screening test: If you have a prostate, ask your care team if a prostate cancer screening test (PSA) at age 55 is right for you.  Lung cancer screening: If you are a current or former smoker ages 50 to 80, ask your care team if ongoing lung cancer screenings are right for you.  For informational purposes only. Not to replace the advice of your health care provider. Copyright   2023 [employer]. All rights reserved. Clinically reviewed by the [facility] Transitions Program. [employer] [id number] - REV 01/24.

## 2024-12-11 NOTE — NURSING NOTE
Prior to immunization administration, verified patients identity using patient s name and date of birth. Please see Immunization Activity for additional information.     Screening Questionnaire for Adult Immunization    Are you sick today?   No   Do you have allergies to medications, food, a vaccine component or latex?   No   Have you ever had a serious reaction after receiving a vaccination?   No   Do you have a long-term health problem with heart, lung, kidney, or metabolic disease (e.g., diabetes), asthma, a blood disorder, no spleen, complement component deficiency, a cochlear implant, or a spinal fluid leak?  Are you on long-term aspirin therapy?   No   Do you have cancer, leukemia, HIV/AIDS, or any other immune system problem?   No   Do you have a parent, brother, or sister with an immune system problem?   No   In the past 3 months, have you taken medications that affect  your immune system, such as prednisone, other steroids, or anticancer drugs; drugs for the treatment of rheumatoid arthritis, Crohn s disease, or psoriasis; or have you had radiation treatments?   No   Have you had a seizure, or a brain or other nervous system problem?   No   During the past year, have you received a transfusion of blood or blood    products, or been given immune (gamma) globulin or antiviral drug?   No   For women: Are you pregnant or is there a chance you could become       pregnant during the next month?   No   Have you received any vaccinations in the past 4 weeks?   No     Immunization questionnaire answers were all negative.      Patient instructed to remain in clinic for 15 minutes afterwards, and to report any adverse reactions.     Screening performed by Sarah Westfall MA on 12/11/2024 at 4:22 PM.
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
